# Patient Record
Sex: FEMALE | Race: WHITE | HISPANIC OR LATINO | Employment: UNEMPLOYED | URBAN - METROPOLITAN AREA
[De-identification: names, ages, dates, MRNs, and addresses within clinical notes are randomized per-mention and may not be internally consistent; named-entity substitution may affect disease eponyms.]

---

## 2023-08-10 ENCOUNTER — HOSPITAL ENCOUNTER (EMERGENCY)
Facility: HOSPITAL | Age: 33
Discharge: HOME | End: 2023-08-10
Attending: EMERGENCY MEDICINE

## 2023-08-10 VITALS
OXYGEN SATURATION: 100 % | BODY MASS INDEX: 23.95 KG/M2 | DIASTOLIC BLOOD PRESSURE: 65 MMHG | TEMPERATURE: 99 F | RESPIRATION RATE: 18 BRPM | HEIGHT: 60 IN | WEIGHT: 122 LBS | HEART RATE: 96 BPM | SYSTOLIC BLOOD PRESSURE: 117 MMHG

## 2023-08-10 DIAGNOSIS — R21 RASH: Primary | ICD-10-CM

## 2023-08-10 LAB
B-HCG UR QL: NEGATIVE
CTP QC/QA: YES

## 2023-08-10 PROCEDURE — 99282 EMERGENCY DEPT VISIT SF MDM: CPT

## 2023-08-10 PROCEDURE — 81025 URINE PREGNANCY TEST: CPT | Performed by: PHYSICIAN ASSISTANT

## 2023-08-10 PROCEDURE — 25000003 PHARM REV CODE 250: Performed by: PHYSICIAN ASSISTANT

## 2023-08-10 PROCEDURE — 25000003 PHARM REV CODE 250: Performed by: EMERGENCY MEDICINE

## 2023-08-10 RX ORDER — CETIRIZINE HYDROCHLORIDE 10 MG/1
10 TABLET ORAL
Status: COMPLETED | OUTPATIENT
Start: 2023-08-10 | End: 2023-08-10

## 2023-08-10 RX ORDER — CETIRIZINE HYDROCHLORIDE 10 MG/1
10 TABLET ORAL DAILY
Qty: 20 TABLET | Refills: 0 | Status: SHIPPED | OUTPATIENT
Start: 2023-08-10 | End: 2023-08-30

## 2023-08-10 RX ORDER — ACETAMINOPHEN 325 MG/1
650 TABLET ORAL
Status: COMPLETED | OUTPATIENT
Start: 2023-08-10 | End: 2023-08-10

## 2023-08-10 RX ADMIN — CETIRIZINE HYDROCHLORIDE 10 MG: 10 TABLET, FILM COATED ORAL at 03:08

## 2023-08-10 RX ADMIN — ACETAMINOPHEN 650 MG: 325 TABLET ORAL at 02:08

## 2023-08-10 NOTE — ED PROVIDER NOTES
Encounter Date: 8/10/2023       History     Chief Complaint   Patient presents with    Rash     Started last night and has spread all over the body. Small puss filled bumps. Has been itching and had a fever.     33-year-old healthy female with no known past medical history presents with complaint of rash.  Patient says that last night she had onset of a generalized rash throughout her body including her face, torso, arms and legs.  She denies any recent travel.  Denies fever, shortness of breath, nausea, vomiting, throat swelling, any recent medication use including antibiotics, anyone with similar symptoms.  She says that there is no associated pain, there is occasional itching.  She is never had symptoms like this before.  Denies any recent tick or bug bites.  Denies any peeling of the rash.    The history is provided by the patient. The history is limited by a language barrier. A  was used.     Review of patient's allergies indicates:  No Known Allergies  No past medical history on file.  No past surgical history on file.  No family history on file.     Review of Systems    Physical Exam     Initial Vitals [08/10/23 1418]   BP Pulse Resp Temp SpO2   117/65 96 18 99.1 °F (37.3 °C) 100 %      MAP       --         Physical Exam    Nursing note and vitals reviewed.  Constitutional: She appears well-developed. She is not diaphoretic. No distress.   HENT:   Head: Normocephalic and atraumatic.   Eyes: EOM are normal. Pupils are equal, round, and reactive to light.   Neck:   Normal range of motion.  Cardiovascular:  Normal rate.           Pulmonary/Chest: No respiratory distress.   Abdominal: She exhibits no distension.   Musculoskeletal:         General: Normal range of motion.      Cervical back: Normal range of motion.     Neurological: She is alert and oriented to person, place, and time.   Skin: Skin is warm and dry. Rash noted.   Rash covering face, torso, bilateral upper and lower extremities.   Spares palms and soles.  No mucosal involvement.  Negative Nikolsky sign. See clinical image below.    Psychiatric: She has a normal mood and affect.           ED Course   Procedures  Labs Reviewed   POCT URINE PREGNANCY          Imaging Results    None          Medications   acetaminophen tablet 650 mg (650 mg Oral Given 8/10/23 1430)   cetirizine tablet 10 mg (10 mg Oral Given 8/10/23 1530)     Medical Decision Making:   Initial Assessment:   No prior records available  Differential Diagnosis:   Allergic reaction, SJS, TENS, erythema multiforme, erythema nodosum, meningitis, urticaria, scabies  Clinical Tests:   Lab Tests: Ordered and Reviewed  ED Management:  33-year-old female presents with 2 days painless diffuse rash, mild associated itching.  Afebrile, stable vital signs.  Low suspicion for necrotizing infection (including SJS/TEN) given no necrosis, bullae, or crepitance, negative nikolsky, no mucosal involvement. The patient is overall well appearing, non toxic, vs wnl, afebrile. No new medications, severe pain, or immunosuppression. They were given strict return precautions including to return for mucosal involvement of the rash, fever>100.4, severe pain or any other concerns. Given zyrtec for symptomatic therapy. Patient instructed to follow up outpatient for ongoing care. Referrals placed for primary care and dermatology.     No acute emergent medical condition has been identified. The patient appears to be low risk for an emergent medical condition is appropriate for discharge with outpatient f/u as detailed in discharge instructions for reevaluation and possible continued outpatient workup and management. I have discussed the workup with the patient, who has verbalized understanding of the plan and need for outpatient follow-up.  This evaluation does not preclude the development of an emergent condition so in addition, I have advised the patient that they can return to the ED at any time with worsening  or change of their symptoms, or with any other medical complaint.                             Clinical Impression:   Final diagnoses:  [R21] Rash (Primary)        ED Disposition Condition    Discharge Stable          ED Prescriptions       Medication Sig Dispense Start Date End Date Auth. Provider    cetirizine (ZYRTEC) 10 MG tablet Take 1 tablet (10 mg total) by mouth once daily. You can increase to twice daily as needed for itching. for 20 days 20 tablet 8/10/2023 8/30/2023 Camila Kim MD          Follow-up Information       Follow up With Specialties Details Why Contact Info Additional Information    [facility] Emergency Dept Emergency Medicine  As needed, If symptoms worsen [address] [id number] [phone]     [facility] Family Medicine Family Medicine Schedule an appointment as soon as possible for a visit in 2 days  [address], [address]  [facility] [id number] [phone] Please park in [address] or D and use [address] entrance. Take Medical Office Bldg. elevators.    [facility] DERMATOLOGY Dermatology Schedule an appointment as soon as possible for a visit in 2 days  [address] [address]  [phone]              Camila Kim MD  08/10/23 9425

## 2023-08-10 NOTE — Clinical Note
"Elvia ROOT" Vidal Nassar was seen and treated in our emergency department on 8/10/2023.  She may return to work on 08/14/2023.       If you have any questions or concerns, please don't hesitate to call.      Michael Joyce LPN    "

## 2023-08-10 NOTE — DISCHARGE INSTRUCTIONS

## 2024-11-19 ENCOUNTER — OFFICE VISIT (OUTPATIENT)
Dept: OBSTETRICS AND GYNECOLOGY | Facility: CLINIC | Age: 34
End: 2024-11-19
Payer: MEDICAID

## 2024-11-19 VITALS
HEIGHT: 60 IN | WEIGHT: 138.25 LBS | SYSTOLIC BLOOD PRESSURE: 110 MMHG | BODY MASS INDEX: 27.14 KG/M2 | DIASTOLIC BLOOD PRESSURE: 64 MMHG

## 2024-11-19 DIAGNOSIS — N93.9 ABNORMAL UTERINE BLEEDING (AUB): Primary | ICD-10-CM

## 2024-11-19 LAB
B-HCG UR QL: POSITIVE
CTP QC/QA: YES

## 2024-11-19 PROCEDURE — 99203 OFFICE O/P NEW LOW 30 MIN: CPT | Mod: S$PBB,,, | Performed by: STUDENT IN AN ORGANIZED HEALTH CARE EDUCATION/TRAINING PROGRAM

## 2024-11-19 PROCEDURE — 81025 URINE PREGNANCY TEST: CPT | Mod: PBBFAC | Performed by: STUDENT IN AN ORGANIZED HEALTH CARE EDUCATION/TRAINING PROGRAM

## 2024-11-19 PROCEDURE — 99999 PR PBB SHADOW E&M-EST. PATIENT-LVL III: CPT | Mod: PBBFAC,,, | Performed by: STUDENT IN AN ORGANIZED HEALTH CARE EDUCATION/TRAINING PROGRAM

## 2024-11-19 PROCEDURE — 99999PBSHW POCT URINE PREGNANCY: Mod: PBBFAC,,,

## 2024-11-19 PROCEDURE — 99213 OFFICE O/P EST LOW 20 MIN: CPT | Mod: PBBFAC | Performed by: STUDENT IN AN ORGANIZED HEALTH CARE EDUCATION/TRAINING PROGRAM

## 2024-11-19 RX ORDER — ACETAMINOPHEN 500 MG
500 TABLET ORAL EVERY 6 HOURS PRN
Qty: 30 TABLET | Refills: 3 | Status: SHIPPED | OUTPATIENT
Start: 2024-11-19

## 2024-11-19 RX ORDER — B-COMPLEX WITH VITAMIN C
1 TABLET ORAL DAILY
Qty: 90 TABLET | Refills: 2 | Status: SHIPPED | OUTPATIENT
Start: 2024-11-19

## 2024-11-19 NOTE — PROGRESS NOTES
Subjective     Patient ID: Elvia Nassar is a 34 y.o. female.    Chief Complaint:  Gynecologic Exam    History of Present Illness  35 yo  at 4w4d presents for pregnancy confirmation    Took 6 UPT at home, all positive. UPT in office positive today  This is a desired pregnancy, last one was 17 years ago in [facility], was a  section. She says because she was 17 they measured her pelvis and told her vaginal delivery would not be successful so she had a primary C section  Pt reports she is Rh negative, says she was told she will need rhogam in her second pregnancy (possibly child same blood type). Denies bleeding this pregnancy     Not currently insured and will be applying for medicaid after this visit  Lives in [place], drove 30 minutes to get here today bc of no appointments at [place] location      [employer] [id number]    GYN & OB History  Patient's last menstrual period was 10/18/2024.   Date of Last Pap: No result found    OB History    Para Term  AB Living   2             SAB IAB Ectopic Multiple Live Births                  # Outcome Date GA Lbr Keshav/2nd Weight Sex Type Anes PTL Lv   2 Current            1  2007     CS-Unspec         Birth Comments: Pelvimetry performed in [facility], was told she cannot labor       Review of Systems  Review of Systems   All other systems reviewed and are negative.         Objective   Physical Exam:   Constitutional: She is oriented to person, place, and time. She appears well-developed and well-nourished.    HENT:   Head: Normocephalic and atraumatic.    Eyes: Conjunctivae and EOM are normal.      Pulmonary/Chest: Effort normal.                  Musculoskeletal: Normal range of motion.       Neurological: She is alert and oriented to person, place, and time.    Skin: Skin is warm and dry.    Psychiatric: She has a normal mood and affect.         Recent Labs   Lab Result Units 24  1137   POC Preg Test, Ur  Positive*           Assessment and Plan     1. Abnormal uterine bleeding (AUB)         Plan:  1. Abnormal uterine bleeding (AUB) (Primary)  - discussed recommended blood work, pt will go to hospital to apply for Medicaid and then can get labs drawn  - Will try to arrange follow up at [facility] due to closer proximity to her house  - Recommended tylenol for abdominal pain  - Instructed her to proceed to hospital if she has any vaginal bleeding due to reported Rh negative blood type  - US ordered     - POCT Urine Pregnancy  - US OB/GYN Procedure ([facility]) - Extended List; Future    Follow up in 4 weeks for IOB      Los Lambert III, MD  [facility] - OB GYN   [address]  [place] [address] [phone]

## 2024-11-19 NOTE — PATIENT INSTRUCTIONS
Please proceed to [facility] medicaid office to apply for coverage now that you are pregnant

## 2024-11-20 ENCOUNTER — TELEPHONE (OUTPATIENT)
Dept: OBSTETRICS AND GYNECOLOGY | Facility: CLINIC | Age: 34
End: 2024-11-20
Payer: MEDICAID

## 2024-11-20 NOTE — TELEPHONE ENCOUNTER
Received mess from Jose that pt would like to transfer to [facility]. Placed call to pt with assist of Handy Mckeon #[phone]. Explained process of OB navigator appt. Scheduled OB navigator vv at convenient time for pt. Lmp 10/18/24. Questions answered. Verbalized understanding.

## 2024-11-20 NOTE — TELEPHONE ENCOUNTER
Called with  [phone]     Got pt scheduled for initial OB appt with . Pt asked where her prenatals from  are. Gave her location of [facility]. Pt VU. Pt also has ob juliane appt on 11/25.

## 2024-11-20 NOTE — TELEPHONE ENCOUNTER
----- Message from Los Lambert MD sent at 11/19/2024 11:53 AM CST -----  Regarding: OB at [facility], this patient is establishing care for pregnancy at [facility] but she lives in [place].     Could you give her a call if there are any slots for initial OB with any providers at [place]? Phone # is correct.    Thank you  Dr. Lambert

## 2024-11-29 ENCOUNTER — HOSPITAL ENCOUNTER (EMERGENCY)
Facility: HOSPITAL | Age: 34
Discharge: HOME | End: 2024-11-29
Attending: EMERGENCY MEDICINE
Payer: MEDICAID

## 2024-11-29 VITALS
OXYGEN SATURATION: 98 % | HEIGHT: 60 IN | HEART RATE: 77 BPM | WEIGHT: 138 LBS | TEMPERATURE: 98 F | BODY MASS INDEX: 27.09 KG/M2 | SYSTOLIC BLOOD PRESSURE: 127 MMHG | RESPIRATION RATE: 17 BRPM | DIASTOLIC BLOOD PRESSURE: 79 MMHG

## 2024-11-29 DIAGNOSIS — O20.9 VAGINAL BLEEDING AFFECTING EARLY PREGNANCY: Primary | ICD-10-CM

## 2024-11-29 LAB
ABO GROUP BLD: NORMAL
B-HCG UR QL: POSITIVE
BACTERIA #/AREA URNS HPF: ABNORMAL /HPF
BASOPHILS # BLD AUTO: 0.02 K/UL (ref 0–0.2)
BASOPHILS NFR BLD: 0.4 % (ref 0–1.9)
BILIRUB UR QL STRIP: NEGATIVE
BLD GP AB SCN CELLS X3 SERPL QL: NORMAL
CLARITY UR: CLEAR
COLOR UR: COLORLESS
CTP QC/QA: YES
DIFFERENTIAL METHOD BLD: ABNORMAL
EOSINOPHIL # BLD AUTO: 0.1 K/UL (ref 0–0.5)
EOSINOPHIL NFR BLD: 1.1 % (ref 0–8)
ERYTHROCYTE [DISTWIDTH] IN BLOOD BY AUTOMATED COUNT: 12.6 % (ref 11.5–14.5)
GLUCOSE UR QL STRIP: NEGATIVE
HCG INTACT+B SERPL-ACNC: NORMAL MIU/ML
HCT VFR BLD AUTO: 35.1 % (ref 37–48.5)
HGB BLD-MCNC: 11.9 G/DL (ref 12–16)
HGB UR QL STRIP: ABNORMAL
IMM GRANULOCYTES # BLD AUTO: 0.02 K/UL (ref 0–0.04)
IMM GRANULOCYTES NFR BLD AUTO: 0.4 % (ref 0–0.5)
INJECT RH IG VOL PATIENT: NORMAL ML
KETONES UR QL STRIP: NEGATIVE
LEUKOCYTE ESTERASE UR QL STRIP: NEGATIVE
LYMPHOCYTES # BLD AUTO: 1.8 K/UL (ref 1–4.8)
LYMPHOCYTES NFR BLD: 38.1 % (ref 18–48)
MCH RBC QN AUTO: 32.2 PG (ref 27–31)
MCHC RBC AUTO-ENTMCNC: 33.9 G/DL (ref 32–36)
MCV RBC AUTO: 95 FL (ref 82–98)
MICROSCOPIC COMMENT: ABNORMAL
MONOCYTES # BLD AUTO: 0.6 K/UL (ref 0.3–1)
MONOCYTES NFR BLD: 12 % (ref 4–15)
NEUTROPHILS # BLD AUTO: 2.3 K/UL (ref 1.8–7.7)
NEUTROPHILS NFR BLD: 48 % (ref 38–73)
NITRITE UR QL STRIP: NEGATIVE
NRBC BLD-RTO: 0 /100 WBC
PH UR STRIP: 6 [PH] (ref 5–8)
PLATELET # BLD AUTO: 236 K/UL (ref 150–450)
PMV BLD AUTO: 10 FL (ref 9.2–12.9)
PROT UR QL STRIP: NEGATIVE
RBC # BLD AUTO: 3.69 M/UL (ref 4–5.4)
RBC #/AREA URNS HPF: 7 /HPF (ref 0–4)
RH BLD: NORMAL
SP GR UR STRIP: 1.01 (ref 1–1.03)
SQUAMOUS #/AREA URNS HPF: 1 /HPF
URN SPEC COLLECT METH UR: ABNORMAL
UROBILINOGEN UR STRIP-ACNC: NEGATIVE EU/DL
WBC # BLD AUTO: 4.75 K/UL (ref 3.9–12.7)
WBC #/AREA URNS HPF: 1 /HPF (ref 0–5)

## 2024-11-29 PROCEDURE — 85025 COMPLETE CBC W/AUTO DIFF WBC: CPT | Performed by: EMERGENCY MEDICINE

## 2024-11-29 PROCEDURE — 81025 URINE PREGNANCY TEST: CPT | Performed by: EMERGENCY MEDICINE

## 2024-11-29 PROCEDURE — 99284 EMERGENCY DEPT VISIT MOD MDM: CPT | Mod: 25

## 2024-11-29 PROCEDURE — 86850 RBC ANTIBODY SCREEN: CPT | Performed by: EMERGENCY MEDICINE

## 2024-11-29 PROCEDURE — 63600175 PHARM REV CODE 636 W HCPCS: Mod: JZ,JG | Performed by: EMERGENCY MEDICINE

## 2024-11-29 PROCEDURE — 84702 CHORIONIC GONADOTROPIN TEST: CPT | Performed by: EMERGENCY MEDICINE

## 2024-11-29 PROCEDURE — 86901 BLOOD TYPING SEROLOGIC RH(D): CPT | Performed by: EMERGENCY MEDICINE

## 2024-11-29 PROCEDURE — 96372 THER/PROPH/DIAG INJ SC/IM: CPT | Performed by: EMERGENCY MEDICINE

## 2024-11-29 PROCEDURE — 81000 URINALYSIS NONAUTO W/SCOPE: CPT | Performed by: EMERGENCY MEDICINE

## 2024-11-29 RX ADMIN — HUMAN RHO(D) IMMUNE GLOBULIN 1500 UNITS: 1500 INJECTION, SOLUTION INTRAMUSCULAR; INTRAVENOUS at 06:11

## 2024-11-29 NOTE — ED NOTES
This RN contacted lab in regards to Rhogam. Endorses difficulty seeing order. Order released. Awaiting medication.

## 2024-11-29 NOTE — ED PROVIDER NOTES
Encounter Date: 2024       History     Chief Complaint   Patient presents with    Vaginal Bleeding     Patient reports to the ED complaining of lower abdominal pain and spotting that started ~40 minutes ago. Patient is 6 weeks pregnant. Ambulatory to triage, NAD noted.     Patient is a 34-year-old female who says she is 6 weeks pregnant and began with pelvic pain and vaginal bleeding today.  No urinary complaints.  No vomiting.  She is currently receiving prenatal care.      Review of patient's allergies indicates:  No Known Allergies  History reviewed. No pertinent past medical history.  Past Surgical History:   Procedure Laterality Date     SECTION      Reports she could not labor dur to pelvimetry in [facility]     Family History   Problem Relation Name Age of Onset    Ovarian cancer Paternal Grandmother      Genetic Disorder Neg Hx      Breast cancer Neg Hx      Colon cancer Neg Hx       Social History     Tobacco Use    Smoking status: Never    Smokeless tobacco: Never   Substance Use Topics    Alcohol use: Not Currently    Drug use: Never     Review of Systems   Genitourinary:  Positive for pelvic pain and vaginal bleeding.   All other systems reviewed and are negative.      Physical Exam     Initial Vitals [24 1245]   BP Pulse Resp Temp SpO2   116/70 90 16 98.3 °F (36.8 °C) 100 %      MAP       --         Physical Exam    Nursing note and vitals reviewed.  Constitutional: No distress.   Eyes: Conjunctivae are normal.   Cardiovascular:  Normal rate, regular rhythm and normal heart sounds.           Pulmonary/Chest: No respiratory distress.   Abdominal: Abdomen is soft. Bowel sounds are normal.   There is mild suprapubic tenderness without guarding or rebound.   Musculoskeletal:         General: No edema.     Neurological: She is alert and oriented to person, place, and time.   Skin: Skin is dry.         ED Course   Procedures  Labs Reviewed   URINALYSIS - Abnormal       Result Value     Specimen UA Urine, Clean Catch      Color, UA Colorless (*)     Appearance, UA Clear      pH, UA 6.0      Specific Gravity, UA 1.015      Protein, UA Negative      Glucose, UA Negative      Ketones, UA Negative      Bilirubin (UA) Negative      Occult Blood UA 2+ (*)     Nitrite, UA Negative      Urobilinogen, UA Negative      Leukocytes, UA Negative     CBC W/ AUTO DIFFERENTIAL - Abnormal    WBC 4.75      RBC 3.69 (*)     Hemoglobin 11.9 (*)     Hematocrit 35.1 (*)     MCV 95      MCH 32.2 (*)     MCHC 33.9      RDW 12.6      Platelets 236      MPV 10.0      Immature Granulocytes 0.4      Gran # (ANC) 2.3      Immature Grans (Abs) 0.02      Lymph # 1.8      Mono # 0.6      Eos # 0.1      Baso # 0.02      nRBC 0      Gran % 48.0      Lymph % 38.1      Mono % 12.0      Eosinophil % 1.1      Basophil % 0.4      Differential Method Automated      Narrative:     Release to patient->Immediate   URINALYSIS MICROSCOPIC - Abnormal    RBC, UA 7 (*)     WBC, UA 1      Bacteria Rare      Squam Epithel, UA 1      Microscopic Comment SEE COMMENT     POCT URINE PREGNANCY - Abnormal    POC Preg Test, Ur Positive (*)      Acceptable Yes     HCG, QUANTITATIVE    HCG Quant [id number]      Narrative:     Release to patient->Immediate   GROUP & RH    ABO O      Rh Type NEG     INDIRECT ANTIGLOBULIN TEST    Indirect Dean NEG     PREPARE RH IMMUNE GLOBULIN    Rh Immune Glogulin RHG [id number] Issued            Imaging Results              US OB <14 Wks TransAbd & TransVag, Single Gestation (XPD) (Final result)  Result time 11/29/24 16:11:21   Procedure changed from US OB Limited 1 Or More Gestations     Final result by Michael Nava MD (11/29/24 16:11:21)                   Impression:      Single live intrauterine pregnancy with estimated gestational age 5 weeks 6 days. and an MARQUIS of 07/26/2025.    Small subchorionic hemorrhage.    Left ovarian 3.3 cm simple cyst, within normal limits for physiologic size range.  In the  absence of any current left-sided pelvic pain, no follow-up necessary.      Electronically signed by: Michael Nava MD  Date:    11/29/2024  Time:    16:11               Narrative:    EXAMINATION:  US OB <14 WEEKS, TRANSABDOM & TRANSVAG, SINGLE GESTATION (XPD)    CLINICAL HISTORY:  Vaginal bleeding in pregnancy;    TECHNIQUE:  Transabdominal sonography of the pelvis was performed, followed by transvaginal sonography to better evaluate the uterus and ovaries.    COMPARISON:  None.    FINDINGS:  Uterus: 9 x 5 x 4.8 cm.  No discrete fibroid.    Intrauterine gestation(s): Single    Mean gestational sac diameter: 0.9 cm    Yolk sac: Present    Crown-rump length (CRL): 0.2 cm    Cardiac activity: 120 bpm    Subchorionic hemorrhage: 1 x 0.9 x 0.3 cm    Right ovary: 2.3 x 2.6 x 2 cm with intact arterial and venous flow    Left ovary: 4 x 3.5 x 4.2 cm with intact arterial and venous flow containing a 3.3 x 3.2 x 3.2 cm well-circumscribed nonvascular anechoic mass suggestive of a simple cyst.    Miscellaneous: No Free Fluid.                                       Medications   rho(d) immune globulin 1,500 unit (300 mcg)/1.3 mL injection 1,500 Units (1,500 Units Intramuscular Given 11/29/24 1804)     Medical Decision Making  Emergent evaluation of a 34-year-old female who is 6 weeks pregnant and began with vaginal bleeding and pelvic pain today.  Patient is Rh negative and a RhoGAM shot has been ordered.  Ultrasound results are pending at the time of shift change and further care of the patient will be turned over to Dr. Samuels pending results and final disposition.    Amount and/or Complexity of Data Reviewed  Labs: ordered.     Details: CBC with a hemoglobin of 11.9 and hematocrit of 35.1.  Urinalysis with 2+ occult blood, no signs of infection.  Beta hCG is 11,409.  Radiology: ordered.    Risk  Prescription drug management.                                      Clinical Impression:  Final diagnoses:  [O20.9] Vaginal  bleeding affecting early pregnancy (Primary)          ED Disposition Condition    Discharge Stable          ED Prescriptions    None       Follow-up Information       Follow up With Specialties Details Why Contact Info    Kelli Timmons MD Obstetrics and Gynecology Schedule an appointment as soon as possible for a visit   [address] [place] [address]  [phone]      [facility] - Emergency Dept Emergency Medicine  As needed, If symptoms worsen [address] [place] [id number] [phone]             Shashank Mayfield MD  11/30/24 0618

## 2024-11-29 NOTE — ED NOTES
Assessment completed with family as  per pt request. 35 yo female patient presents to the ED with c/o vaginal bleeding while approx 6 weeks pregnant. Patient explains around 1100 this morning she went to the bathroom and noted moderate amount of blood with urination. States she just went to the bathroom in ED and noted scant amount of blood while wiping. Patient denies filling pads or blood noted at any other point. Patient endorses mild abd cramping that has since began. Patient 2nd pregnancy, 1st was 17 years ago. Pt denies prenatal visit thus far. Updated on care plan. Bloodwork and urine sent to lab. Pt denies needs. Nadn. Care ongoing.

## 2024-11-29 NOTE — ED NOTES
Patient updated on plan of care with use of family as . Patient aware of upcoming medication and awaiting u/s reading. Denies questions. Care ongoing.

## 2024-11-29 NOTE — PROVIDER PROGRESS NOTES - EMERGENCY DEPT.
Encounter Date: 11/29/2024    ED Physician Progress Notes              Patient signed out pending ultrasound result.    TVUS Impression:     Single live intrauterine pregnancy with estimated gestational age 5 weeks 6 days. and an MARQUIS of 07/26/2025.     Small subchorionic hemorrhage.     Left ovarian 3.3 cm simple cyst, within normal limits for physiologic size range.  In the absence of any current left-sided pelvic pain, no follow-up necessary.    Patient says her pain is mild and well-controlled, informed about incidental finding of left ovarian cyst.  Says that she already has follow up scheduled with OB, Dr. Timmons next month.  Said that she is already taking prenatal vitamins.  Counseled about strict return precautions and given outpatient follow up.    No acute emergent medical condition has been identified. The patient appears to be low risk for an emergent medical condition is appropriate for discharge with outpatient f/u as detailed in discharge instructions for reevaluation and possible continued outpatient workup and management. I have discussed the workup with the patient, who has verbalized understanding of the plan and need for outpatient follow-up.  This evaluation does not preclude the development of an emergent condition so in addition, I have advised the patient that they can return to the ED at any time with worsening or change of their symptoms, or with any other medical complaint.

## 2024-11-29 NOTE — DISCHARGE INSTRUCTIONS

## 2024-12-02 ENCOUNTER — TELEPHONE (OUTPATIENT)
Dept: OBSTETRICS AND GYNECOLOGY | Facility: CLINIC | Age: 34
End: 2024-12-02
Payer: MEDICAID

## 2024-12-02 NOTE — TELEPHONE ENCOUNTER
Called patient and informed her that we did not have anything sooner and she stated she would keep her appointment.       ----- Message from Tata sent at 2024  1:22 PM CST -----  [phone]  Type:  Sooner Appointment Request    Patient is requesting a sooner appointment.  Patient declined first available appointment listed as well as another facility and provider .  Patient will not accept being placed on the waitlist and is requesting a message be sent to doctor.    Name of Caller:  self     When is the first available appointment?     Symptoms:  initial OB, asked if she can have a sooner appt due to going out of town .     Would the patient rather a call back or a response via [facility]?  Call back     Best Call Back Number:  [phone]

## 2024-12-04 ENCOUNTER — CLINICAL SUPPORT (OUTPATIENT)
Dept: OBSTETRICS AND GYNECOLOGY | Facility: CLINIC | Age: 34
End: 2024-12-04
Payer: MEDICAID

## 2024-12-04 ENCOUNTER — PATIENT MESSAGE (OUTPATIENT)
Dept: OBSTETRICS AND GYNECOLOGY | Facility: CLINIC | Age: 34
End: 2024-12-04

## 2024-12-04 DIAGNOSIS — N91.2 AMENORRHEA: Primary | ICD-10-CM

## 2024-12-04 NOTE — PROGRESS NOTES
Spoke with patient for a total of 60 minutes during phone call due to pt unable to connect to virtual visit. Pt driving but parked for phone call visit. [employer] interp assisted with call [place] #[phone].  Updated chart to reflect up to date patient demographics.  Allergies, medications, pharmacy, medical/family history and OB history updated.  Patient was guided through expectations of care during pregnancy.  First routine OB appt scheduled. Initial OB appt previously scheduled. Had u/s in ED-did not reschedule.   Education provided & questions answered. Encouraged to send message or call office with any questions/concerns. Verbalized understanding.     Discussed with pt:    Lmp 10/18; MARQUIS 7/26/25 per u/s report   taking PNV   denies n/v  denies cramping/spotting  C/o headaches  Precautions discussed  Referred to [email or web address] for Preg A to Z guide & class schedule   Discussed benefits of breastfeeding - plans to breastfeed   Discussed need for pediatrician  Had ED visit on 11/29-u/s done ~5w6d with + cardiac activity & small subchorionic hemorrhage per report

## 2024-12-19 ENCOUNTER — ROUTINE PRENATAL (OUTPATIENT)
Dept: OBSTETRICS AND GYNECOLOGY | Facility: CLINIC | Age: 34
End: 2024-12-19
Payer: MEDICAID

## 2024-12-19 VITALS
BODY MASS INDEX: 27.71 KG/M2 | WEIGHT: 141.88 LBS | SYSTOLIC BLOOD PRESSURE: 105 MMHG | DIASTOLIC BLOOD PRESSURE: 71 MMHG

## 2024-12-19 DIAGNOSIS — Z13.79 GENETIC SCREENING: ICD-10-CM

## 2024-12-19 DIAGNOSIS — Z12.4 PAP SMEAR FOR CERVICAL CANCER SCREENING: ICD-10-CM

## 2024-12-19 DIAGNOSIS — Z36.0 ENCOUNTER FOR ANTENATAL SCREENING FOR CHROMOSOMAL ANOMALIES: ICD-10-CM

## 2024-12-19 DIAGNOSIS — Z36.89 ENCOUNTER FOR FETAL ANATOMIC SURVEY: ICD-10-CM

## 2024-12-19 DIAGNOSIS — Z11.3 SCREENING EXAMINATION FOR STD (SEXUALLY TRANSMITTED DISEASE): ICD-10-CM

## 2024-12-19 DIAGNOSIS — Z3A.08 8 WEEKS GESTATION OF PREGNANCY: Primary | ICD-10-CM

## 2024-12-19 DIAGNOSIS — Z72.89 OTHER PROBLEMS RELATED TO LIFESTYLE: ICD-10-CM

## 2024-12-19 LAB
BILIRUB SERPL-MCNC: NEGATIVE MG/DL
BLOOD URINE, POC: NORMAL
CLARITY, POC UA: NORMAL
COLOR, POC UA: NORMAL
GLUCOSE UR QL STRIP: NEGATIVE
KETONES UR QL STRIP: NEGATIVE
LEUKOCYTE ESTERASE URINE, POC: NEGATIVE
NITRITE, POC UA: NEGATIVE
PH, POC UA: 7
PROTEIN, POC: NEGATIVE
SPECIFIC GRAVITY, POC UA: 1.01
UROBILINOGEN, POC UA: 0.2

## 2024-12-19 PROCEDURE — 99212 OFFICE O/P EST SF 10 MIN: CPT | Mod: PBBFAC,TH,PO | Performed by: OBSTETRICS & GYNECOLOGY

## 2024-12-19 PROCEDURE — 0352U VAGINOSIS SCREEN BY DNA PROBE: CPT | Performed by: OBSTETRICS & GYNECOLOGY

## 2024-12-19 PROCEDURE — 87491 CHLMYD TRACH DNA AMP PROBE: CPT | Performed by: OBSTETRICS & GYNECOLOGY

## 2024-12-19 PROCEDURE — 99999 PR PBB SHADOW E&M-EST. PATIENT-LVL II: CPT | Mod: PBBFAC,,, | Performed by: OBSTETRICS & GYNECOLOGY

## 2024-12-19 PROCEDURE — 81002 URINALYSIS NONAUTO W/O SCOPE: CPT | Mod: PBBFAC,PO | Performed by: OBSTETRICS & GYNECOLOGY

## 2024-12-19 PROCEDURE — 99999PBSHW POCT URINE DIPSTICK WITHOUT MICROSCOPE: Mod: PBBFAC,,,

## 2024-12-19 RX ORDER — PRENATAL WITH FERROUS FUM AND FOLIC ACID 3080; 920; 120; 400; 22; 1.84; 3; 20; 10; 1; 12; 200; 27; 25; 2 [IU]/1; [IU]/1; MG/1; [IU]/1; MG/1; MG/1; MG/1; MG/1; MG/1; MG/1; UG/1; MG/1; MG/1; MG/1; MG/1
1 TABLET ORAL DAILY
Qty: 90 TABLET | Refills: 3 | Status: SHIPPED | OUTPATIENT
Start: 2024-12-19 | End: 2025-12-19

## 2024-12-19 NOTE — PROGRESS NOTES
Doing well, has seen spotting but not for 2 weeks.     FHT normal   Beside US normal appearing    OB: h/o  at 17     Pelvic: normal appearing cervix, no bleeding, closed     @ 8 5/7 wga   IOB labs ordered   Discussed MT21, desires, ordered  Anatomy US ordered   History of , desires repeat   Rh negative, got rhogam in hospital   Unsure partners blood type but ordered for him to do     Face to Face time with patient: 30 minutes of total time spent on the encounter, which includes face to face time and non-face to face time preparing to see the patient (eg, review of tests), Obtaining and/or reviewing separately obtained history, Documenting clinical information in the electronic or other health record, Independently interpreting results (not separately reported) and communicating results to the patient/family/caregiver, or Care coordination (not separately reported).

## 2024-12-20 ENCOUNTER — TELEPHONE (OUTPATIENT)
Dept: OBSTETRICS AND GYNECOLOGY | Facility: CLINIC | Age: 34
End: 2024-12-20
Payer: MEDICAID

## 2024-12-20 NOTE — TELEPHONE ENCOUNTER
Called patient and informed her that I will schedule her future OB appointment. Patient stated any day mornings was fine. She also wants to know the gender of the baby in the portal and also during her appointment.

## 2024-12-24 ENCOUNTER — TELEPHONE (OUTPATIENT)
Dept: OBSTETRICS AND GYNECOLOGY | Facility: CLINIC | Age: 34
End: 2024-12-24
Payer: MEDICAID

## 2024-12-24 RX ORDER — TERCONAZOLE 4 MG/G
1 CREAM VAGINAL NIGHTLY
Qty: 45 G | Refills: 1 | Status: SHIPPED | OUTPATIENT
Start: 2024-12-24 | End: 2024-12-31

## 2024-12-24 NOTE — TELEPHONE ENCOUNTER
----- Message from Kelli Timmons MD sent at 12/24/2024  6:36 AM CST -----  Pls let her know she tested positive for yeast. This could have been contributing tot he vaginal spotting. I sent her in vaginal cream she uses nightly for a week.

## 2024-12-31 ENCOUNTER — PATIENT MESSAGE (OUTPATIENT)
Dept: OBSTETRICS AND GYNECOLOGY | Facility: CLINIC | Age: 34
End: 2024-12-31
Payer: MEDICAID

## 2025-01-07 ENCOUNTER — LAB VISIT (OUTPATIENT)
Dept: LAB | Facility: HOSPITAL | Age: 35
End: 2025-01-07
Attending: OBSTETRICS & GYNECOLOGY
Payer: MEDICAID

## 2025-01-07 DIAGNOSIS — Z3A.08 8 WEEKS GESTATION OF PREGNANCY: ICD-10-CM

## 2025-01-07 DIAGNOSIS — Z13.79 GENETIC SCREENING: ICD-10-CM

## 2025-01-07 DIAGNOSIS — Z72.89 OTHER PROBLEMS RELATED TO LIFESTYLE: ICD-10-CM

## 2025-01-07 DIAGNOSIS — Z11.3 SCREENING EXAMINATION FOR STD (SEXUALLY TRANSMITTED DISEASE): ICD-10-CM

## 2025-01-07 DIAGNOSIS — Z36.0 ENCOUNTER FOR ANTENATAL SCREENING FOR CHROMOSOMAL ANOMALIES: ICD-10-CM

## 2025-01-07 LAB
ABO + RH BLD: ABNORMAL
BLD GP AB SCN CELLS X3 SERPL QL: ABNORMAL
BLOOD GROUP ANTIBODIES SERPL: NORMAL
DAT IGG-SP REAG RBC-IMP: NORMAL
HBV SURFACE AG SERPL QL IA: NORMAL
HCV AB SERPL QL IA: NORMAL
HIV 1+2 AB+HIV1 P24 AG SERPL QL IA: NORMAL
IRON SERPL-MCNC: 76 UG/DL (ref 30–160)
SATURATED IRON: 21 % (ref 20–50)
TOTAL IRON BINDING CAPACITY: 360 UG/DL (ref 250–450)
TRANSFERRIN SERPL-MCNC: 243 MG/DL (ref 200–375)
TREPONEMA PALLIDUM IGG+IGM AB [PRESENCE] IN SERUM OR PLASMA BY IMMUNOASSAY: NONREACTIVE

## 2025-01-07 PROCEDURE — 81220 CFTR GENE COM VARIANTS: CPT | Performed by: OBSTETRICS & GYNECOLOGY

## 2025-01-07 PROCEDURE — 86900 BLOOD TYPING SEROLOGIC ABO: CPT | Performed by: OBSTETRICS & GYNECOLOGY

## 2025-01-07 PROCEDURE — 86880 COOMBS TEST DIRECT: CPT | Performed by: OBSTETRICS & GYNECOLOGY

## 2025-01-07 PROCEDURE — 87389 HIV-1 AG W/HIV-1&-2 AB AG IA: CPT | Performed by: OBSTETRICS & GYNECOLOGY

## 2025-01-07 PROCEDURE — 86762 RUBELLA ANTIBODY: CPT | Performed by: OBSTETRICS & GYNECOLOGY

## 2025-01-07 PROCEDURE — 83540 ASSAY OF IRON: CPT | Performed by: OBSTETRICS & GYNECOLOGY

## 2025-01-07 PROCEDURE — 87340 HEPATITIS B SURFACE AG IA: CPT | Performed by: OBSTETRICS & GYNECOLOGY

## 2025-01-07 PROCEDURE — 83021 HEMOGLOBIN CHROMOTOGRAPHY: CPT | Performed by: OBSTETRICS & GYNECOLOGY

## 2025-01-07 PROCEDURE — 86803 HEPATITIS C AB TEST: CPT | Performed by: OBSTETRICS & GYNECOLOGY

## 2025-01-07 PROCEDURE — 36415 COLL VENOUS BLD VENIPUNCTURE: CPT | Performed by: OBSTETRICS & GYNECOLOGY

## 2025-01-07 PROCEDURE — 86870 RBC ANTIBODY IDENTIFICATION: CPT | Performed by: OBSTETRICS & GYNECOLOGY

## 2025-01-07 PROCEDURE — 86593 SYPHILIS TEST NON-TREP QUANT: CPT | Performed by: OBSTETRICS & GYNECOLOGY

## 2025-01-08 LAB
HGB A2 MFR BLD HPLC: 3 % (ref 2.2–3.2)
HGB FRACT BLD ELPH-IMP: NORMAL
HGB FRACT BLD ELPH-IMP: NORMAL
RUBV IGG SER-ACNC: 102 IU/ML
RUBV IGG SER-IMP: REACTIVE
WEAK D AG RBC QL: NORMAL

## 2025-01-10 LAB — CFTR MUT ANL BLD/T: NORMAL

## 2025-01-12 LAB
ABOUT THE TEST: NORMAL
APPROVED BY: NORMAL
FETAL FRACTION: NORMAL
FETAL SEX RESULT: NORMAL
GESTATIONAL AGE > 9: YES
GESTATIONAL AGE: NORMAL
LAB DIRECTOR COMMENTS: NORMAL
LIMITATIONS:: NORMAL
MONOSOMY X RESULT: NOT DETECTED
NEGATIVE PREDICTIVE VALUE: NORMAL
NOTE: NORMAL
PERFORMANCE CHARACTERISTICS: NORMAL
PERFORMANCE: NORMAL
POSITIVE PREDICTIVE VALUE: NORMAL
RESULT: NEGATIVE
SERVICE CMNT 04-IMP: NORMAL
TEST METHODOLOGY:: NORMAL
TRISOMY 13 (T13): NEGATIVE
TRISOMY 18: NEGATIVE
TRISOMY 21 (T21): NEGATIVE
XXX (TRIPLE X SYNDROME): NOT DETECTED
XXY (KLINEFELTER SYNDROME): NOT DETECTED
XYY (JACOBS SYNDROME): NOT DETECTED

## 2025-01-13 ENCOUNTER — TELEPHONE (OUTPATIENT)
Dept: OBSTETRICS AND GYNECOLOGY | Facility: CLINIC | Age: 35
End: 2025-01-13
Payer: MEDICAID

## 2025-01-13 NOTE — TELEPHONE ENCOUNTER
Pls let her know:     The maternity 21 was negative for down syndrome, trisomy 13 and 18.     If she wants to  know the baby's gender? If not don't open the itravelhart results for the [id number] because it will say it on the results.     You could put send it in this message or leave an envelope at the  or just tell her:     Its a girl

## 2025-01-15 NOTE — TELEPHONE ENCOUNTER
Called patient and informed her that her maternity 21 was negative for down syndrome, trisomy 13 and 18 and that she was having a baby girl.

## 2025-01-16 ENCOUNTER — TELEPHONE (OUTPATIENT)
Dept: OBSTETRICS AND GYNECOLOGY | Facility: CLINIC | Age: 35
End: 2025-01-16
Payer: MEDICAID

## 2025-01-16 ENCOUNTER — PATIENT MESSAGE (OUTPATIENT)
Dept: ADMINISTRATIVE | Facility: OTHER | Age: 35
End: 2025-01-16
Payer: MEDICAID

## 2025-01-16 ENCOUNTER — ROUTINE PRENATAL (OUTPATIENT)
Dept: OBSTETRICS AND GYNECOLOGY | Facility: CLINIC | Age: 35
End: 2025-01-16
Payer: MEDICAID

## 2025-01-16 VITALS — DIASTOLIC BLOOD PRESSURE: 66 MMHG | WEIGHT: 140.63 LBS | BODY MASS INDEX: 27.46 KG/M2 | SYSTOLIC BLOOD PRESSURE: 99 MMHG

## 2025-01-16 DIAGNOSIS — Z3A.12 12 WEEKS GESTATION OF PREGNANCY: Primary | ICD-10-CM

## 2025-01-16 LAB
BILIRUB SERPL-MCNC: NORMAL MG/DL
BLOOD URINE, POC: NORMAL
CLARITY, POC UA: CLEAR
COLOR, POC UA: YELLOW
GLUCOSE UR QL STRIP: NORMAL
KETONES UR QL STRIP: NORMAL
LEUKOCYTE ESTERASE URINE, POC: NORMAL
NITRITE, POC UA: NORMAL
PH, POC UA: 7.5
PROTEIN, POC: NORMAL
SPECIFIC GRAVITY, POC UA: 1.02
UROBILINOGEN, POC UA: NORMAL

## 2025-01-16 PROCEDURE — 99999 PR PBB SHADOW E&M-EST. PATIENT-LVL II: CPT | Mod: PBBFAC,,, | Performed by: OBSTETRICS & GYNECOLOGY

## 2025-01-16 PROCEDURE — 99213 OFFICE O/P EST LOW 20 MIN: CPT | Mod: TH,S$PBB,, | Performed by: OBSTETRICS & GYNECOLOGY

## 2025-01-16 PROCEDURE — 99999PBSHW POCT URINE DIPSTICK WITHOUT MICROSCOPE: Mod: PBBFAC,,,

## 2025-01-16 PROCEDURE — 81002 URINALYSIS NONAUTO W/O SCOPE: CPT | Mod: PBBFAC,PO | Performed by: OBSTETRICS & GYNECOLOGY

## 2025-01-16 PROCEDURE — 99212 OFFICE O/P EST SF 10 MIN: CPT | Mod: PBBFAC,PO | Performed by: OBSTETRICS & GYNECOLOGY

## 2025-01-16 NOTE — TELEPHONE ENCOUNTER
----- Message from Анна sent at 1/14/2025  8:13 AM CST -----  Contact: pt  Type:  Test Results    Who Called: pt   Name of Test (Lab/Mammo/Etc): labs   Date of Test: 1/07  Ordering Provider: Shanelle   Where the test was performed: [facility]   Would the patient rather a call back or a response via [facility]? Call   Best Call Back Number:  [phone]  Additional Information:

## 2025-01-17 NOTE — PROGRESS NOTES
Doing well, no issues     FHT normal    @ 14  wga   IOB labs ordered   Negative MT21, female  Anatomy US ordered   History of , desires repeat   Rh negative, got rhogam in hospital   Unsure partners blood type but ordered for him to do     Face to Face time with patient: 20 minutes of total time spent on the encounter, which includes face to face time and non-face to face time preparing to see the patient (eg, review of tests), Obtaining and/or reviewing separately obtained history, Documenting clinical information in the electronic or other health record, Independently interpreting results (not separately reported) and communicating results to the patient/family/caregiver, or Care coordination (not separately reported).

## 2025-02-08 ENCOUNTER — PATIENT MESSAGE (OUTPATIENT)
Dept: OTHER | Facility: OTHER | Age: 35
End: 2025-02-08
Payer: MEDICAID

## 2025-02-13 ENCOUNTER — ROUTINE PRENATAL (OUTPATIENT)
Dept: OBSTETRICS AND GYNECOLOGY | Facility: CLINIC | Age: 35
End: 2025-02-13
Payer: MEDICAID

## 2025-02-13 VITALS
WEIGHT: 145.31 LBS | HEART RATE: 83 BPM | BODY MASS INDEX: 28.38 KG/M2 | SYSTOLIC BLOOD PRESSURE: 108 MMHG | DIASTOLIC BLOOD PRESSURE: 60 MMHG

## 2025-02-13 DIAGNOSIS — Z3A.16 16 WEEKS GESTATION OF PREGNANCY: Primary | ICD-10-CM

## 2025-02-13 LAB
BILIRUB SERPL-MCNC: NORMAL MG/DL
BLOOD URINE, POC: NORMAL
CLARITY, POC UA: CLEAR
COLOR, POC UA: YELLOW
GLUCOSE UR QL STRIP: NORMAL
KETONES UR QL STRIP: NORMAL
LEUKOCYTE ESTERASE URINE, POC: NORMAL
NITRITE, POC UA: NORMAL
PH, POC UA: 6
PROTEIN, POC: NORMAL
SPECIFIC GRAVITY, POC UA: 1.01
UROBILINOGEN, POC UA: 0.2

## 2025-02-13 PROCEDURE — 99213 OFFICE O/P EST LOW 20 MIN: CPT | Mod: PBBFAC,PO | Performed by: OBSTETRICS & GYNECOLOGY

## 2025-02-13 PROCEDURE — 99999 PR PBB SHADOW E&M-EST. PATIENT-LVL III: CPT | Mod: PBBFAC,,, | Performed by: OBSTETRICS & GYNECOLOGY

## 2025-02-13 PROCEDURE — 99999PBSHW POCT URINE DIPSTICK WITHOUT MICROSCOPE: Mod: PBBFAC,,,

## 2025-02-13 PROCEDURE — 81002 URINALYSIS NONAUTO W/O SCOPE: CPT | Mod: PBBFAC,PO | Performed by: OBSTETRICS & GYNECOLOGY

## 2025-02-13 RX ORDER — VALACYCLOVIR HYDROCHLORIDE 500 MG/1
500 TABLET, FILM COATED ORAL 2 TIMES DAILY PRN
Qty: 30 TABLET | Refills: 1 | Status: SHIPPED | OUTPATIENT
Start: 2025-02-13 | End: 2025-02-16

## 2025-02-13 NOTE — PROGRESS NOTES
Doing well, no issues     FHT normal    @ 16 5 wga   IOB labs done  Negative MT21, female  Anatomy US ordered 3/13  History of , desires repeat   Rh negative, got rhogam in hospital   Unsure partners blood type but ordered for him to do   HSV: valtrex at 36 wga     Face to Face time with patient: 20 minutes of total time spent on the encounter, which includes face to face time and non-face to face time preparing to see the patient (eg, review of tests), Obtaining and/or reviewing separately obtained history, Documenting clinical information in the electronic or other health record, Independently interpreting results (not separately reported) and communicating results to the patient/family/caregiver, or Care coordination (not separately reported).

## 2025-02-15 ENCOUNTER — PATIENT MESSAGE (OUTPATIENT)
Dept: OTHER | Facility: OTHER | Age: 35
End: 2025-02-15
Payer: MEDICAID

## 2025-03-08 ENCOUNTER — PATIENT MESSAGE (OUTPATIENT)
Dept: OTHER | Facility: OTHER | Age: 35
End: 2025-03-08
Payer: MEDICAID

## 2025-03-13 ENCOUNTER — PROCEDURE VISIT (OUTPATIENT)
Dept: MATERNAL FETAL MEDICINE | Facility: CLINIC | Age: 35
End: 2025-03-13
Payer: MEDICAID

## 2025-03-13 ENCOUNTER — ROUTINE PRENATAL (OUTPATIENT)
Dept: OBSTETRICS AND GYNECOLOGY | Facility: CLINIC | Age: 35
End: 2025-03-13
Payer: MEDICAID

## 2025-03-13 VITALS
HEART RATE: 91 BPM | WEIGHT: 147.5 LBS | SYSTOLIC BLOOD PRESSURE: 114 MMHG | DIASTOLIC BLOOD PRESSURE: 61 MMHG | BODY MASS INDEX: 28.8 KG/M2

## 2025-03-13 DIAGNOSIS — Z36.89 ENCOUNTER FOR FETAL ANATOMIC SURVEY: ICD-10-CM

## 2025-03-13 DIAGNOSIS — Z3A.20 20 WEEKS GESTATION OF PREGNANCY: Primary | ICD-10-CM

## 2025-03-13 LAB
BILIRUB SERPL-MCNC: ABNORMAL MG/DL
BLOOD URINE, POC: ABNORMAL
CLARITY, POC UA: CLEAR
COLOR, POC UA: YELLOW
GLUCOSE UR QL STRIP: ABNORMAL
KETONES UR QL STRIP: ABNORMAL
LEUKOCYTE ESTERASE URINE, POC: ABNORMAL
NITRITE, POC UA: ABNORMAL
PH, POC UA: 6
PROTEIN, POC: ABNORMAL
SPECIFIC GRAVITY, POC UA: >1.03
UROBILINOGEN, POC UA: 0.2

## 2025-03-13 PROCEDURE — 76805 OB US >/= 14 WKS SNGL FETUS: CPT | Mod: PBBFAC,PO | Performed by: OBSTETRICS & GYNECOLOGY

## 2025-03-13 PROCEDURE — 99212 OFFICE O/P EST SF 10 MIN: CPT | Mod: PBBFAC,PO | Performed by: OBSTETRICS & GYNECOLOGY

## 2025-03-13 PROCEDURE — 99999PBSHW POCT URINE DIPSTICK WITHOUT MICROSCOPE: Mod: PBBFAC,,,

## 2025-03-13 PROCEDURE — 99999 PR PBB SHADOW E&M-EST. PATIENT-LVL II: CPT | Mod: PBBFAC,,, | Performed by: OBSTETRICS & GYNECOLOGY

## 2025-03-13 PROCEDURE — 81002 URINALYSIS NONAUTO W/O SCOPE: CPT | Mod: PBBFAC,PO | Performed by: OBSTETRICS & GYNECOLOGY

## 2025-03-13 NOTE — PROGRESS NOTES
Doing well, no issues     FHT normal    @ 20 5 wga   IOB labs done  Negative MT21, female  Anatomy US done today   History of , desires repeat   Rh negative, got rhogam in hospital   Unsure partners blood type but ordered for him to do   HSV: valtrex at 36 wga     Face to Face time with patient: 30 minutes of total time spent on the encounter, which includes face to face time and non-face to face time preparing to see the patient (eg, review of tests), Obtaining and/or reviewing separately obtained history, Documenting clinical information in the electronic or other health record, Independently interpreting results (not separately reported) and communicating results to the patient/family/caregiver, or Care coordination (not separately reported).

## 2025-03-14 RX ORDER — PRENATAL WITH FERROUS FUM AND FOLIC ACID 3080; 920; 120; 400; 22; 1.84; 3; 20; 10; 1; 12; 200; 27; 25; 2 [IU]/1; [IU]/1; MG/1; [IU]/1; MG/1; MG/1; MG/1; MG/1; MG/1; MG/1; UG/1; MG/1; MG/1; MG/1; MG/1
1 TABLET ORAL DAILY
Qty: 90 TABLET | Refills: 3 | Status: SHIPPED | OUTPATIENT
Start: 2025-03-14 | End: 2026-03-14

## 2025-03-14 NOTE — TELEPHONE ENCOUNTER
Refill Routing Note   Medication(s) are not appropriate for processing by [facility] for the following reason(s):        Outside of protocol    [facility] action(s):  Route               Appointments  past 12m or future 3m with PCP    Date Provider   Last Visit   3/13/2025 Kelli Timmons MD   Next Visit   4/10/2025 Kelli Timmons MD   ED visits in past 90 days: 0        Note composed:12:15 PM 03/14/2025

## 2025-03-17 ENCOUNTER — LAB VISIT (OUTPATIENT)
Dept: LAB | Facility: HOSPITAL | Age: 35
End: 2025-03-17
Attending: OBSTETRICS & GYNECOLOGY
Payer: MEDICAID

## 2025-03-17 DIAGNOSIS — Z3A.20 20 WEEKS GESTATION OF PREGNANCY: ICD-10-CM

## 2025-03-17 LAB — GLUCOSE SERPL-MCNC: 87 MG/DL (ref 70–140)

## 2025-03-17 PROCEDURE — 36415 COLL VENOUS BLD VENIPUNCTURE: CPT | Performed by: OBSTETRICS & GYNECOLOGY

## 2025-03-17 PROCEDURE — 82950 GLUCOSE TEST: CPT | Performed by: OBSTETRICS & GYNECOLOGY

## 2025-03-17 RX ORDER — PRENATAL WITH FERROUS FUM AND FOLIC ACID 3080; 920; 120; 400; 22; 1.84; 3; 20; 10; 1; 12; 200; 27; 25; 2 [IU]/1; [IU]/1; MG/1; [IU]/1; MG/1; MG/1; MG/1; MG/1; MG/1; MG/1; UG/1; MG/1; MG/1; MG/1; MG/1
1 TABLET ORAL DAILY
Qty: 90 TABLET | Refills: 3 | OUTPATIENT
Start: 2025-03-17 | End: 2026-03-17

## 2025-03-17 NOTE — TELEPHONE ENCOUNTER
Refill Decision Note   Elvia Nassar  is requesting a refill authorization.  Brief Assessment and Rationale for Refill:  Quick Discontinue     Medication Therapy Plan: Receipt confirmed by pharmacy (3/14/2025 12:26 PM CDT)      Pharmacist review requested: Yes   Comments:     Note composed:3:26 PM 03/17/2025

## 2025-03-17 NOTE — TELEPHONE ENCOUNTER
----- Message from Nakul sent at 3/14/2025 12:59 PM CDT -----  Contact: pt  Type: Requesting to speak with nurseNewo Called: PTRegarding: questions about  US appointment Would the patient rather a call back or a response via [facility]? Call [facility] Call Back Number: [phone] Additional Information:

## 2025-03-17 NOTE — TELEPHONE ENCOUNTER
Called pt w/  to answer her question regarding her upcoming US. LVM letting patient know that she can message us through the portal with any questions she may have.

## 2025-03-17 NOTE — TELEPHONE ENCOUNTER
Refill Routing Note   Medication(s) are not appropriate for processing by [facility] for the following reason(s):        Outside of protocol-duplicate to [facility]    [facility] action(s):  Defer        Medication Therapy Plan: Duplcate Rx order. Confirmed filled at pharmacy. Other notes in this message have been completed.    Pharmacist review requested: Yes     Appointments  past 12m or future 3m with PCP    Date Provider   Last Visit   3/13/2025 Kelli Timmons MD   Next Visit   4/10/2025 Kelli Timmons MD   ED visits in past 90 days: 0        Note composed:2:24 PM 03/17/2025

## 2025-03-18 ENCOUNTER — TELEPHONE (OUTPATIENT)
Dept: OBSTETRICS AND GYNECOLOGY | Facility: CLINIC | Age: 35
End: 2025-03-18
Payer: MEDICAID

## 2025-03-18 ENCOUNTER — RESULTS FOLLOW-UP (OUTPATIENT)
Dept: OBSTETRICS AND GYNECOLOGY | Facility: CLINIC | Age: 35
End: 2025-03-18

## 2025-03-18 ENCOUNTER — PATIENT MESSAGE (OUTPATIENT)
Dept: OBSTETRICS AND GYNECOLOGY | Facility: CLINIC | Age: 35
End: 2025-03-18
Payer: MEDICAID

## 2025-03-18 NOTE — TELEPHONE ENCOUNTER
Please let her know    The anatomy ultrasound looked good however there were some views of the baby's heart and spine that we couldn't see as well as we would like. We don't see anything that looks wrong or concerning but we don't want to miss anything so I recommend repeating the ultrasound in 4-6 weeks. They have it set up on 4/10 at 11. Please let us know if that doesn't work.

## 2025-03-31 ENCOUNTER — TELEPHONE (OUTPATIENT)
Dept: OBSTETRICS AND GYNECOLOGY | Facility: CLINIC | Age: 35
End: 2025-03-31
Payer: MEDICAID

## 2025-03-31 NOTE — TELEPHONE ENCOUNTER
Spoke with patient, informed her appointments are correct for 4/10----- Message from Di sent at 3/28/2025 11:42 AM CDT -----  Type:  Needs Medical AdviceWho Called: pt BARBARA DE DIOS [[id number]]Would the patient rather a call back or a response via [facility]? Call [facility] Call Back Number: [phone] Additional Information: pt requesting a cll back in regards to time frame for US and OB appt wants to know if time frame needs to be change on OB appt because scheduled at the same time

## 2025-04-05 ENCOUNTER — PATIENT MESSAGE (OUTPATIENT)
Dept: OTHER | Facility: OTHER | Age: 35
End: 2025-04-05
Payer: MEDICAID

## 2025-04-10 ENCOUNTER — PATIENT MESSAGE (OUTPATIENT)
Dept: MATERNAL FETAL MEDICINE | Facility: CLINIC | Age: 35
End: 2025-04-10
Payer: MEDICAID

## 2025-04-10 ENCOUNTER — ROUTINE PRENATAL (OUTPATIENT)
Dept: OBSTETRICS AND GYNECOLOGY | Facility: CLINIC | Age: 35
End: 2025-04-10
Payer: MEDICAID

## 2025-04-10 ENCOUNTER — PATIENT MESSAGE (OUTPATIENT)
Dept: OBSTETRICS AND GYNECOLOGY | Facility: CLINIC | Age: 35
End: 2025-04-10

## 2025-04-10 ENCOUNTER — PROCEDURE VISIT (OUTPATIENT)
Dept: MATERNAL FETAL MEDICINE | Facility: CLINIC | Age: 35
End: 2025-04-10
Payer: MEDICAID

## 2025-04-10 VITALS
BODY MASS INDEX: 29.8 KG/M2 | SYSTOLIC BLOOD PRESSURE: 106 MMHG | WEIGHT: 152.63 LBS | HEART RATE: 93 BPM | DIASTOLIC BLOOD PRESSURE: 69 MMHG

## 2025-04-10 DIAGNOSIS — Z36.89 ENCOUNTER FOR FETAL ANATOMIC SURVEY: ICD-10-CM

## 2025-04-10 DIAGNOSIS — O33.7XX0 FETAL ASCITES CAUSING DISPROPORTION: ICD-10-CM

## 2025-04-10 DIAGNOSIS — Z3A.24 24 WEEKS GESTATION OF PREGNANCY: Primary | ICD-10-CM

## 2025-04-10 PROCEDURE — 99213 OFFICE O/P EST LOW 20 MIN: CPT | Mod: PBBFAC,TH,PO | Performed by: OBSTETRICS & GYNECOLOGY

## 2025-04-10 PROCEDURE — 99999 PR PBB SHADOW E&M-EST. PATIENT-LVL III: CPT | Mod: PBBFAC,,, | Performed by: OBSTETRICS & GYNECOLOGY

## 2025-04-10 PROCEDURE — 76816 OB US FOLLOW-UP PER FETUS: CPT | Mod: PBBFAC,PO | Performed by: OBSTETRICS & GYNECOLOGY

## 2025-04-10 NOTE — PROGRESS NOTES
Doing well, no issues     FHT normal    @ 24 5/7 wga   IOB labs done  Negative MT21, female  Anatomy US done today   History of , desires repeat   Rh negative, got rhogam in hospital   Unsure partners blood type but ordered for him to do   H/o HSV: valtrex at 36 wga   Baby noted to have ascitics on repeat US, no h/o infection, had rhogam this pregnancy but no other antibodies, still limited cardiac views, discussed with mom possibility of causes idiopathic, infectious, antibodies/fetal anemia, chromosomal, a defect in heart or bowel. Discussed with MFM and they will see her. Doesn't feel needs TORCH titers now.     Face to Face time with patient: 30 minutes of total time spent on the encounter, which includes face to face time and non-face to face time preparing to see the patient (eg, review of tests), Obtaining and/or reviewing separately obtained history, Documenting clinical information in the electronic or other health record, Independently interpreting results (not separately reported) and communicating results to the patient/family/caregiver, or Care coordination (not separately reported).

## 2025-04-13 PROBLEM — O28.3 ABNORMAL PRENATAL ULTRASOUND: Status: ACTIVE | Noted: 2025-04-13

## 2025-04-14 ENCOUNTER — PROCEDURE VISIT (OUTPATIENT)
Dept: MATERNAL FETAL MEDICINE | Facility: CLINIC | Age: 35
End: 2025-04-14
Payer: MEDICAID

## 2025-04-14 ENCOUNTER — OFFICE VISIT (OUTPATIENT)
Dept: MATERNAL FETAL MEDICINE | Facility: CLINIC | Age: 35
End: 2025-04-14
Payer: MEDICAID

## 2025-04-14 ENCOUNTER — TELEPHONE (OUTPATIENT)
Dept: OBSTETRICS AND GYNECOLOGY | Facility: CLINIC | Age: 35
End: 2025-04-14
Payer: MEDICAID

## 2025-04-14 ENCOUNTER — LAB VISIT (OUTPATIENT)
Dept: LAB | Facility: OTHER | Age: 35
End: 2025-04-14
Attending: STUDENT IN AN ORGANIZED HEALTH CARE EDUCATION/TRAINING PROGRAM
Payer: MEDICAID

## 2025-04-14 VITALS
BODY MASS INDEX: 30.23 KG/M2 | WEIGHT: 154.75 LBS | SYSTOLIC BLOOD PRESSURE: 124 MMHG | DIASTOLIC BLOOD PRESSURE: 71 MMHG

## 2025-04-14 DIAGNOSIS — O33.7XX0 FETAL ASCITES CAUSING DISPROPORTION: ICD-10-CM

## 2025-04-14 DIAGNOSIS — O28.3 ABNORMAL PRENATAL ULTRASOUND: Primary | ICD-10-CM

## 2025-04-14 DIAGNOSIS — Z36.89 ENCOUNTER FOR ULTRASOUND TO ASSESS FETAL GROWTH: ICD-10-CM

## 2025-04-14 DIAGNOSIS — O28.3 ABNORMAL PRENATAL ULTRASOUND: ICD-10-CM

## 2025-04-14 LAB
INDIRECT COOMBS: NORMAL
RH BLD: NORMAL
SPECIMEN OUTDATE: NORMAL

## 2025-04-14 PROCEDURE — 86747 PARVOVIRUS ANTIBODY: CPT

## 2025-04-14 PROCEDURE — 3008F BODY MASS INDEX DOCD: CPT | Mod: CPTII,,, | Performed by: OBSTETRICS & GYNECOLOGY

## 2025-04-14 PROCEDURE — 99999 PR PBB SHADOW E&M-EST. PATIENT-LVL III: CPT | Mod: PBBFAC,,, | Performed by: OBSTETRICS & GYNECOLOGY

## 2025-04-14 PROCEDURE — 1159F MED LIST DOCD IN RCRD: CPT | Mod: CPTII,,, | Performed by: OBSTETRICS & GYNECOLOGY

## 2025-04-14 PROCEDURE — 76816 OB US FOLLOW-UP PER FETUS: CPT | Mod: PBBFAC | Performed by: OBSTETRICS & GYNECOLOGY

## 2025-04-14 PROCEDURE — 3078F DIAST BP <80 MM HG: CPT | Mod: CPTII,,, | Performed by: OBSTETRICS & GYNECOLOGY

## 2025-04-14 PROCEDURE — 86777 TOXOPLASMA ANTIBODY: CPT

## 2025-04-14 PROCEDURE — 86900 BLOOD TYPING SEROLOGIC ABO: CPT | Performed by: STUDENT IN AN ORGANIZED HEALTH CARE EDUCATION/TRAINING PROGRAM

## 2025-04-14 PROCEDURE — 99203 OFFICE O/P NEW LOW 30 MIN: CPT | Mod: S$PBB,TH,, | Performed by: OBSTETRICS & GYNECOLOGY

## 2025-04-14 PROCEDURE — 36415 COLL VENOUS BLD VENIPUNCTURE: CPT

## 2025-04-14 PROCEDURE — 3074F SYST BP LT 130 MM HG: CPT | Mod: CPTII,,, | Performed by: OBSTETRICS & GYNECOLOGY

## 2025-04-14 PROCEDURE — 86778 TOXOPLASMA ANTIBODY IGM: CPT

## 2025-04-14 PROCEDURE — 76816 OB US FOLLOW-UP PER FETUS: CPT | Mod: 26,S$PBB,, | Performed by: OBSTETRICS & GYNECOLOGY

## 2025-04-14 PROCEDURE — 99213 OFFICE O/P EST LOW 20 MIN: CPT | Mod: PBBFAC,TH | Performed by: OBSTETRICS & GYNECOLOGY

## 2025-04-14 NOTE — ASSESSMENT & PLAN NOTE
Ascities noted on follow up anatomy. No other abnormalities noted. Rh neg with previous negative T&S. Denies any concerning illnesses in pregnancy. cfDNA low risk.     On today's ultrasound fetal ascites remains present, albeit less then previous. There is no other abnormal fluid collection. There is no evidence of fetal anemia.    Patient was counseled regarding today's ultrasound finding of fetal ascites.  The patient's medical and OB hx were reviewed and are noncontributory. She denies any viral or febrile illnesses during this pregnancy. The patient has had cfDNA testing with low risk results.     On today's exam, there is no evidence of fetal ascites, bowel abnormality, hydrops, or any other fetal structural abnormality. Fetal ascites may occur for various etiological reasons including genitourinary, gastrointestinal, viral/bacterial infection, cardiac anomalies such as a structural defect or arrhythmia. The potential etiologies for fetal ascites were reviewed:  1. Down Syndrome or Han Syndrome  2. Viral infection (if viral, CMV most likely, though can include hepatitis, parvovirus)  3. Cystic fibrosis  4. Bowel perforation  5. Other GI pathology  6. Precursor to fetal hydrops  7.  pathology, such as bladder or renal pelvis rupture with subsequent urinary ascites (not suspected based on appearance of the fetal  bladder or kidneys)  8. Idiopathic    We discussed the potential utility and limitations of undergoing testing for viral etiology.  We also reviewed the option of genetic amniocentesis for diagnosis of fetal cytogenetic abnormalities and for diagnostic testing for CMV infection and cystic fibrosis. The patient does not desire diagnostic testing at this juncture, but will consider it if any serological evaluation is concerning or if there are new ultrasound findings. We also reviewed the recommendation for serial ultrasound exams to assess for continued resolution of ascites, to assess for appearance  of the fetal bowel, and to assess fetal growth. We also discussed that although we have a low suspicion for cardiac pathology, will have her seen by peds cards colleagues out of an abundance of caution.     Recommendations:  Serological testing for viral cause ordered today  Peds cards referral placed  Recommend f/u growth studies at 28 and 34 weeks, or more frequent if indicated.

## 2025-04-14 NOTE — PROGRESS NOTES
MATERNAL-FETAL MEDICINE   CONSULT NOTE    Provider requesting consultation: Shanelle    SUBJECTIVE:   Ms. Elvia Nassar is a 35 y.o.  female with IUP at 25w1d who is seen in consultation by JONESM for evaluation and management of fetal ascites    Current Medications[1]  Past Medical History[2], Past Surgical History[3]  Allergies, family and social history: see chart    OB History    Para Term  AB Living   2 1 1   1   SAB IAB Ectopic Multiple Live Births       1      # Outcome Date GA Lbr Keshav/2nd Weight Sex Type Anes PTL Lv   2 Current            1 Term 10/11/07 40w0d  2.25 kg (4 lb 15.4 oz) M CS-Unspec   ANGELA      Birth Comments: Pelvimetry performed in [facility], was told she cannot labor     Genetic history: The patient denies any inherited genetic diseases or birth defects in herself or her partner's personal history or family.    Objective:   LMP 10/18/2024     Ultrasound performed. See viewpoint for full ultrasound report.  Schwarz IUP with cardiac activity is identified.  Again, fetal ascites is noted. This appears subjectively less than previous. There is no evidence of hydrops fetalis. The bowel is hyperechoic without dilation. Otherwise, there are no structural anomalies identified. The fetal anatomic survey was completed today.   Interval fetal growth has been normal (EFW 39%, AC 42%).  Middle cerebral artery Doppler peak systolic velocity measures normal at 0.77 MoM,   MVP is normal.     Significant labs/imaging:  Cystic fibrosis carrier screening: negative    Lab Results   Component Value Date    TSC33D19 Negative 2025    T18 Negative 2025    VGB53T73 Negative 2025    MONOSOMYXRES Not Detected 2025     ASSESSMENT/PLAN:   35 y.o.  female with IUP at 25w1d   Assessment & Plan  Abnormal prenatal ultrasound  Ascities noted on follow up anatomy. No other abnormalities noted. Rh neg with previous negative T&S. Denies any concerning illnesses in  pregnancy. cfDNA low risk.     On today's ultrasound fetal ascites remains present, albeit less then previous. There is no other abnormal fluid collection. There is no evidence of fetal anemia.    Patient was counseled regarding today's ultrasound finding of fetal ascites.  The patient's medical and OB hx were reviewed and are noncontributory. She denies any viral or febrile illnesses during this pregnancy. The patient has had cfDNA testing with low risk results.     On today's exam, there is no evidence of fetal ascites, bowel abnormality, hydrops, or any other fetal structural abnormality. Fetal ascites may occur for various etiological reasons including genitourinary, gastrointestinal, viral/bacterial infection, cardiac anomalies such as a structural defect or arrhythmia. The potential etiologies for fetal ascites were reviewed:  1. Down Syndrome or Han Syndrome  2. Viral infection (if viral, CMV most likely, though can include hepatitis, parvovirus)  3. Cystic fibrosis  4. Bowel perforation  5. Other GI pathology  6. Precursor to fetal hydrops  7.  pathology, such as bladder or renal pelvis rupture with subsequent urinary ascites (not suspected based on appearance of the fetal  bladder or kidneys)  8. Idiopathic    We discussed the potential utility and limitations of undergoing testing for viral etiology.  We also reviewed the option of genetic amniocentesis for diagnosis of fetal cytogenetic abnormalities and for diagnostic testing for CMV infection and cystic fibrosis. The patient does not desire diagnostic testing at this juncture, but will consider it if any serological evaluation is concerning or if there are new ultrasound findings. We also reviewed the recommendation for serial ultrasound exams to assess for continued resolution of ascites, to assess for appearance of the fetal bowel, and to assess fetal growth. We also discussed that although we have a low suspicion for cardiac pathology, will have  her seen by peds cards colleagues out of an abundance of caution.     Recommendations:  Serological testing for viral cause ordered today  Peds cards referral placed  Recommend f/u growth studies at 28 and 34 weeks, or more frequent if indicated.    FOLLOW UP:   F/u in 3 weeks for US/MD visit    GAVINO Freeman MD  Maternal Fetal Medicine Fellow   PGY-6      ATTENDING ATTESTATION  I have seen the patient and reviewed Dr. Freeman's consultation note, assessment and plan. I have personally spoken to and discussed plan with the patient and agree with the findings. All changes made to the body of the note.    FOLLOW UP:   A follow up ultrasound was made for 3 weeks from today. A follow up MFM MD visit was made for same day.    Patient was counseled that prenatal ultrasound studies have limitations. They do not detect all fetal, genetic, placental, and maternal abnormalities. A normal appearing prenatal ultrasound is reassuring. However, it does not guarantee the absence of an abnormality or predict a normal outcome for the fetus or the mother.     The patient was given an opportunity to ask questions about the management of her high risk pregnancy problems. She expressed an understanding of and agreement to the above impression and plan. All questions were answered to her satisfaction.    30 minutes of total time spent on the encounter, which includes face to face time and non-face to face time preparing to see the patient (eg, review of tests), obtaining and/or reviewing separately obtained history, documenting clinical information in the electronic or other health record, independently interpreting results (not separately reported) and communicating results to the patient/family/caregiver, or care coordination (not separately reported).        Danie Ocampo MD   Maternal-Fetal Medicine      Electronically Signed by Danie Ocampo April 14, 2025        [1]   Current Outpatient Medications   Medication Sig Dispense  Refill    PNV,calcium 72/iron/folic acid (PRENATAL VITAMIN) Tab Take 1 tablet by mouth once daily. 90 tablet 3    valACYclovir (VALTREX) 500 MG tablet Take 1 tablet (500 mg total) by mouth 2 (two) times daily as needed (outbreak). 30 tablet 1     No current facility-administered medications for this visit.   [2] No past medical history on file.  [3]   Past Surgical History:  Procedure Laterality Date     SECTION      Reports she could not labor dur to pelvimetry in [facility]

## 2025-04-15 ENCOUNTER — CLINICAL SUPPORT (OUTPATIENT)
Dept: PEDIATRIC CARDIOLOGY | Facility: CLINIC | Age: 35
End: 2025-04-15
Payer: MEDICAID

## 2025-04-15 ENCOUNTER — OFFICE VISIT (OUTPATIENT)
Dept: PEDIATRIC CARDIOLOGY | Facility: CLINIC | Age: 35
End: 2025-04-15
Attending: PEDIATRICS
Payer: MEDICAID

## 2025-04-15 VITALS
HEART RATE: 107 BPM | SYSTOLIC BLOOD PRESSURE: 108 MMHG | WEIGHT: 154.75 LBS | BODY MASS INDEX: 30.38 KG/M2 | HEIGHT: 60 IN | DIASTOLIC BLOOD PRESSURE: 67 MMHG

## 2025-04-15 DIAGNOSIS — O28.3 ABNORMAL PRENATAL ULTRASOUND: Primary | ICD-10-CM

## 2025-04-15 DIAGNOSIS — Z36.83 ENCOUNTER FOR SCREENING FOR CONGENITAL CARDIAC ABNORMALITIES IN FETUS: ICD-10-CM

## 2025-04-15 DIAGNOSIS — O33.7XX0 FETAL ASCITES CAUSING DISPROPORTION: ICD-10-CM

## 2025-04-15 DIAGNOSIS — O28.3 ABNORMAL PRENATAL ULTRASOUND: ICD-10-CM

## 2025-04-15 PROCEDURE — 3074F SYST BP LT 130 MM HG: CPT | Mod: CPTII,,, | Performed by: PEDIATRICS

## 2025-04-15 PROCEDURE — 3078F DIAST BP <80 MM HG: CPT | Mod: CPTII,,, | Performed by: PEDIATRICS

## 2025-04-15 PROCEDURE — 76825 ECHO EXAM OF FETAL HEART: CPT | Mod: 26,S$PBB,, | Performed by: PEDIATRICS

## 2025-04-15 PROCEDURE — 3008F BODY MASS INDEX DOCD: CPT | Mod: CPTII,,, | Performed by: PEDIATRICS

## 2025-04-15 PROCEDURE — 76827 ECHO EXAM OF FETAL HEART: CPT | Mod: 26,S$PBB,, | Performed by: PEDIATRICS

## 2025-04-15 PROCEDURE — 93325 DOPPLER ECHO COLOR FLOW MAPG: CPT | Mod: PBBFAC | Performed by: PEDIATRICS

## 2025-04-15 PROCEDURE — 76827 ECHO EXAM OF FETAL HEART: CPT | Mod: PBBFAC | Performed by: PEDIATRICS

## 2025-04-15 PROCEDURE — 1160F RVW MEDS BY RX/DR IN RCRD: CPT | Mod: CPTII,,, | Performed by: PEDIATRICS

## 2025-04-15 PROCEDURE — 99204 OFFICE O/P NEW MOD 45 MIN: CPT | Mod: 25,S$PBB,, | Performed by: PEDIATRICS

## 2025-04-15 PROCEDURE — 76825 ECHO EXAM OF FETAL HEART: CPT | Mod: PBBFAC | Performed by: PEDIATRICS

## 2025-04-15 PROCEDURE — 99999 PR PBB SHADOW E&M-EST. PATIENT-LVL III: CPT | Mod: PBBFAC,,, | Performed by: PEDIATRICS

## 2025-04-15 PROCEDURE — 99213 OFFICE O/P EST LOW 20 MIN: CPT | Mod: PBBFAC | Performed by: PEDIATRICS

## 2025-04-15 PROCEDURE — 93325 DOPPLER ECHO COLOR FLOW MAPG: CPT | Mod: 26,S$PBB,, | Performed by: PEDIATRICS

## 2025-04-15 PROCEDURE — 1159F MED LIST DOCD IN RCRD: CPT | Mod: CPTII,,, | Performed by: PEDIATRICS

## 2025-04-15 NOTE — PROGRESS NOTES
I had the pleasure of evaluating Elvia Nassar who is now 35 y.o.  and carrying her 2nd pregnancy at 25 3/7 weeks gestation. She was referred for evaluation of the fetal heart due to increased risks for congenital heart disease associated with fetal ascites. Maternal cell free DNA is negative and mother she has been screened for cystic fibrosis. [facility] has not yet identified an etiology for the ascites observed and fortunately it appears to be improving.    I was assisted throughout this encounter by our online translation services using an iPad.    Current Medications[1]  Review of patient's allergies indicates:  No Known Allergies    Maternal factors increasing risk for congenital heart disease: None identified.  Paternal factors increasing risk for congenital heart disease:  None identified.    FETAL ECHOCARDIOGRAM (preliminary):    Normal fetal cardiac connections and function for estimated gestational age.  No obvious ascites noted on this study.  (Full report in electronic medical record)    I reviewed the strengths and weaknesses of fetal echocardiography including the insufficient sensitivity to rule out many septal defects, anomalies of pulmonary and systemic veins, arch anomalies, and some valvar abnormalities. I also discussed that  resolution of the ductus arteriosus and foramen ovale (normal fetal structures) cannot be assured by fetal evaluation. Finally, I reviewed today's echocardiogram that demonstrates normal anatomical connections and function for the estimated gestational age. Within the limitations imposed by fetal physiology, I would expect a high probability that this infant will be born with a normal heart.  With the quality of the study obtained today and no other identifiable risk factors for congenital heart disease, I do not think that an echocardiogram is necessary following delivery unless the clinical examination is abnormal.    Ms. Vidal Nassar was referred for  evaluation due to the presence of fetal ascites that appears to be a resolving.  Fetal ascites has multiple potential etiologies and not as yet been identified.  The Maternal Fetal studies demonstrated no specific cardiac abnormalities but they felt that it was important to rule out a congenital heart problem that potentially could be the etiology for the effusion that is observed.  No significant hemodynamic abnormality could be identified on this study.      The  assured me that I answered all the mother's questions. I provided an information sheet reviewing the fetal physiology and how this compares to normal  physiology translated into Yoruba.. This includes a reiteration of the limitations of fetal echocardiography that I have discussed today.  Ms. Vidal Nassar does have some ability to read English and also has acquaintances who can help her with English translation so I also suggested that she review the information related to fetal echocardiography available at the [employer] website and provided directions for accessing this site.  I encouraged to call any additional questions in the [facility] program we will assist in translation of any of her concerns.  I have not scheduled another evaluation; however, if questions arise later during gestation or after delivery, I would be happy to assist in any way. Ms. Vidal Nassar is comfortable with the plan.      RECOMMENDATIONS:  Cardiac evaluation of the infant after delivery if the clinical exam is abnormal.                45 minutes of total time spent on the encounter, which includes face to face time and non-face to face time preparing to see the patient (eg, review of tests), obtaining and/or reviewing separately obtained history, documenting clinical information in the electronic or other health record, independently interpreting results (not separately reported) and communicating results to  the patient/family/caregiver, or care coordination (not separately reported).           [1]   Current Outpatient Medications:     PNV,calcium 72/iron/folic acid (PRENATAL VITAMIN) Tab, Take 1 tablet by mouth once daily., Disp: 90 tablet, Rfl: 3    valACYclovir (VALTREX) 500 MG tablet, Take 1 tablet (500 mg total) by mouth 2 (two) times daily as needed (outbreak)., Disp: 30 tablet, Rfl: 1

## 2025-04-15 NOTE — LETTER
April 15, 2025        Kelli Timmons MD  [address]  [place] [address]             [facility] [place] - Maternal Fetal Medicine  [address] [facility]  [place] [address]  Phone: [phone]  Fax: [phone]   Patient: Elvia Nassar   MR Number: [id number]   YOB: 1990   Date of Visit: 4/15/2025       Dear Dr. Timmons:    Thank you for referring Elvia Nassar to me for evaluation. Below are the relevant portions of my assessment and plan of care.            If you have questions, please do not hesitate to call me. I look forward to following Elvia along with you.    Sincerely,      Alex Rowland MD           CC  No Recipients      impaired balance/cognition/pain/impaired postural control/decreased ROM/decreased strength

## 2025-04-16 LAB
T GONDII IGG SER QL IA: REACTIVE
T GONDII IGG SERPL IA-ACNC: 20.8 IU/ML

## 2025-04-17 ENCOUNTER — RESULTS FOLLOW-UP (OUTPATIENT)
Dept: MATERNAL FETAL MEDICINE | Facility: CLINIC | Age: 35
End: 2025-04-17

## 2025-04-17 LAB
B19V IGG SER QL IA: NEGATIVE
B19V IGG+IGM SER-IMP: NORMAL
B19V IGM SER QL IA: NEGATIVE
W TOXOPLASMA IGM ANTIBODY: <3 AU/ML

## 2025-04-19 ENCOUNTER — PATIENT MESSAGE (OUTPATIENT)
Dept: OTHER | Facility: OTHER | Age: 35
End: 2025-04-19
Payer: MEDICAID

## 2025-05-03 ENCOUNTER — PATIENT MESSAGE (OUTPATIENT)
Dept: OTHER | Facility: OTHER | Age: 35
End: 2025-05-03
Payer: MEDICAID

## 2025-05-05 ENCOUNTER — PROCEDURE VISIT (OUTPATIENT)
Dept: MATERNAL FETAL MEDICINE | Facility: CLINIC | Age: 35
End: 2025-05-05
Payer: MEDICAID

## 2025-05-05 ENCOUNTER — OFFICE VISIT (OUTPATIENT)
Dept: MATERNAL FETAL MEDICINE | Facility: CLINIC | Age: 35
End: 2025-05-05
Payer: MEDICAID

## 2025-05-05 VITALS — SYSTOLIC BLOOD PRESSURE: 106 MMHG | WEIGHT: 158.5 LBS | DIASTOLIC BLOOD PRESSURE: 63 MMHG | BODY MASS INDEX: 30.96 KG/M2

## 2025-05-05 DIAGNOSIS — O28.3 ABNORMAL PRENATAL ULTRASOUND: ICD-10-CM

## 2025-05-05 DIAGNOSIS — O33.7XX0 FETAL ASCITES CAUSING DISPROPORTION: ICD-10-CM

## 2025-05-05 DIAGNOSIS — O28.3 ABNORMAL PRENATAL ULTRASOUND: Primary | ICD-10-CM

## 2025-05-05 DIAGNOSIS — Z36.89 ENCOUNTER FOR ULTRASOUND TO ASSESS FETAL GROWTH: ICD-10-CM

## 2025-05-05 DIAGNOSIS — O33.7XX0 FETAL ASCITES CAUSING DISPROPORTION: Primary | ICD-10-CM

## 2025-05-05 PROCEDURE — 99999 PR PBB SHADOW E&M-EST. PATIENT-LVL II: CPT | Mod: PBBFAC,,, | Performed by: OBSTETRICS & GYNECOLOGY

## 2025-05-05 PROCEDURE — 99213 OFFICE O/P EST LOW 20 MIN: CPT | Mod: S$PBB,TH,, | Performed by: OBSTETRICS & GYNECOLOGY

## 2025-05-05 PROCEDURE — 3078F DIAST BP <80 MM HG: CPT | Mod: CPTII,,, | Performed by: OBSTETRICS & GYNECOLOGY

## 2025-05-05 PROCEDURE — 76816 OB US FOLLOW-UP PER FETUS: CPT | Mod: PBBFAC | Performed by: OBSTETRICS & GYNECOLOGY

## 2025-05-05 PROCEDURE — 3074F SYST BP LT 130 MM HG: CPT | Mod: CPTII,,, | Performed by: OBSTETRICS & GYNECOLOGY

## 2025-05-05 PROCEDURE — 96041 GENETIC COUNSELING SVC EA 30: CPT | Mod: ,,, | Performed by: GENETIC COUNSELOR, MS

## 2025-05-05 PROCEDURE — 99212 OFFICE O/P EST SF 10 MIN: CPT | Mod: PBBFAC,TH | Performed by: OBSTETRICS & GYNECOLOGY

## 2025-05-05 PROCEDURE — 3008F BODY MASS INDEX DOCD: CPT | Mod: CPTII,,, | Performed by: OBSTETRICS & GYNECOLOGY

## 2025-05-05 PROCEDURE — 1159F MED LIST DOCD IN RCRD: CPT | Mod: CPTII,,, | Performed by: OBSTETRICS & GYNECOLOGY

## 2025-05-05 PROCEDURE — 76816 OB US FOLLOW-UP PER FETUS: CPT | Mod: 26,S$PBB,, | Performed by: OBSTETRICS & GYNECOLOGY

## 2025-05-05 NOTE — PROGRESS NOTES
Maternal Fetal Medicine follow up consult    Patient is accompanied by her son     SUBJECTIVE:     Elvia Nassar is a 35 y.o.  female with IUP at 28w2d who is seen in follow up consultation by MFM.  Pregnancy complications include:   No problems updated.  Previous notes reviewed.   No changes to medical, surgical, family, social, or obstetric history.    Interval history since last MFM visit:   Patient has no complaints today. She is overall feeling well.  Patient denies any contractions/cramping, vaginal bleeding or leakage of fluid.  She reports good fetal movement.    Medications:  Current Outpatient Medications   Medication Instructions    PNV,calcium 72/iron/folic acid (PRENATAL VITAMIN) Tab 1 tablet, Oral, Daily    valACYclovir (VALTREX) 500 mg, Oral, 2 times daily PRN     Care team members:  Shanelle - Primary OB     OBJECTIVE:   /63 (BP Location: Left arm, Patient Position: Sitting)   Wt 71.9 kg (158 lb 8.2 oz)   LMP 10/18/2024   BMI 30.96 kg/m²     Physical Exam:  Deferred    Ultrasound performed. See viewpoint for full ultrasound report.  Schwarz live IUP  Fetal size is appropriate for gestational age, with the EFW (1141 g) plotting at the 37% and the AC plotting at the 27%.   A limited repeat fetal anatomic survey appears normal. Ascites was no longer seen today. A prominent transverse colon is seen, however this is measuring normal.  The MVP is normal.  Presentation is cephalic     Significant labs/imaging:  Lab Results   Component Value Date    XEG80V27 Negative 2025    T18 Negative 2025    BMG88Y50 Negative 2025    MONOSOMYXRES Not Detected 2025       ASSESSMENT/PLAN:     35 y.o.  female with IUP at 28w2d    Assessment & Plan  Abnormal prenatal ultrasound  Please see original consult for full counseling and recommendations   We discussed today's ultrasound that overall demonstrates resolution of previously seen ascites. No other abnormalities were  noted today.  Given the prior ultrasound, would recommend that we continue to monitor closely.  Fetal ECHO previously found to be WNL    Recommendations:  Growth in 4 weeks weeks with MD visit  Prenatal care per other comorbidities  No specific recommendations for prenatal testing based on this problem.  Mode and timing of delivery per other OB indications.        Patient was counseled that prenatal ultrasound studies have limitations. They do not detect all fetal, genetic, placental, and maternal abnormalities. A normal appearing prenatal ultrasound is reassuring. However, it does not guarantee the absence of an abnormality or predict a normal outcome for the fetus or the mother.     FOLLOW UP:   A follow up ultrasound will be made for 4 weeks from today with a follow up MFM MD visit.    The patient was given an opportunity to ask questions about the management of her high risk pregnancy problems. She expressed an understanding of and agreement to the above impression and plan. All questions were answered to her satisfaction.    20 minutes of total time spent on the encounter, which includes face to face time and non-face to face time preparing to see the patient (eg, review of tests), obtaining and/or reviewing separately obtained history, documenting clinical information in the electronic or other health record, independently interpreting results (not separately reported) and communicating results to the patient/family/caregiver, or care coordination (not separately reported).        Danie Ocampo MD   Maternal-Fetal Medicine      Electronically Signed by Danie Ocampo May 5, 2025

## 2025-05-05 NOTE — ASSESSMENT & PLAN NOTE
Please see original consult for full counseling and recommendations   We discussed today's ultrasound that overall demonstrates resolution of previously seen ascites. No other abnormalities were noted today.  Given the prior ultrasound, would recommend that we continue to monitor closely.  Fetal ECHO previously found to be WNL    Recommendations:  Growth in 4 weeks weeks with MD visit  Prenatal care per other comorbidities  No specific recommendations for prenatal testing based on this problem.  Mode and timing of delivery per other OB indications.

## 2025-05-05 NOTE — PROGRESS NOTES
Office Visit - Genetic Counseling Evaluation   Elvia Nassar  : 1990  MRN: [id number]  REFERRING PROVIDER: Dr. Danie Ocampo  PARTNER NAME: Dave    DATE OF SERVICE: 25  TIME SPENT: 40 min face to face; 60 min total    REASON FOR CONSULT:  Elvia Nassar, a 35 y.o. female with a finding of fetal ascites on ultrasound at 25wga was referred for genetic counseling to discuss the genetic underpinnings of this finding. She came to the appointment with her son.     OBSETRIC HISTORY   AGE AT MARQUIS: 35y  MARQUIS: 2025  GESTATION: [place]  GESTATIONAL AGE:28w2d    PREGNANCY HISTORY    OB History    Para Term  AB Living   2 1 1 0 0 1   SAB IAB Ectopic Multiple Live Births   0 0 0 0 1      # Outcome Date GA Lbr Keshav/2nd Weight Sex Type Anes PTL Lv   2 Current            1 Term 10/11/07 40w0d  2.25 kg (4 lb 15.4 oz) M CS-Unspec   ANGELA      Birth Comments: Pelvimetry performed in [facility], was told she cannot labor       MEDICAL HISTORY:  MEDICATIONS/EXPOSURES: Not discussed    FAMILY HISTORY:  Please see scanned pedigree in patient's chart under media. Patient's ancestry is [place]. FOB ancestry is [place] and [place]. Consanguinity was denied. Family history is non-contributory for this indication.     Additional history negative for multiple miscarriages/stillbirth, developmental delay/intellectual disability, and known genetic disorders. Complete pedigree will be linked to this encounter and can be viewed under the Media tab. The information provided is based on the patient and/or their reproductive partner's recollection of the family history and in the absence of complete medical records. If the family history changes or if more information is obtained, they were asked to contact us as this may alter the recommendations or impression of the family history.     PAST TESTING  Patient carrier screening: Low risk CF and electrophoresis  Reproductive partner carrier  screening: NA  Parental Karyotypes: NA    PREGNANCY TESTING  cfDNA for aneuploidy: Low risk  Diagnostic testing: NA  Fetal karyotype: NA    COUNSELING:   Fetal ascites may occur for various etiological reasons including genitourinary, gastrointestinal, viral/bacterial infection, cardiac anomalies such as a structural defect or arrhythmia. There can be an underlying genetic cause which may include chromosomal anomalies like Trisomy 21 or Trisomy 18. Single gene conditions or a contiguous gene deletion syndromes such as 4p deletion syndrome can also cause ascites. In addition ascites may be idiopathic.     Fetal ascites can progress to hydrops, may resolve or may remain stable. Idiopathic ascites is unlikely to progress to hydrops. Survival among fetuses with isolated fetal ascites was 74% for fetuses diagnosed at 24 weeks of gestation or later and 61% among fetuses diagnosed at less than 24 weeks. Fetal outcome appears to be related to the etiology of the isolated fetal ascites.     Screening tests are not definitive. If the test indicates an increased risk for a chromosome problem, it is recommended to confirm with a diagnostic test such as amniocentesis or CVS. Alternatively, a low risk or negative result on a screening test is reassuring, but it does not eliminate the possibility that the fetus is affected with the condition.     We then reviewed diagnostic testing options. Unlike screening tests, diagnostic tests provide definitive answers regarding the presence of fetal chromosome abnormalities. In addition to assessing for the presence of the common fetal aneuploidies, diagnostic tests can assess for abnormalities in the number and structure of all chromosomes. While both procedures are generally quite safe, there is a risk of miscarriage associated with these procedures.For genetic amniocentesis, the estimated risk of miscarriage attributable to the procedure is 1 in 900.    Esme Hastings MD; Tiffanie Armijo  POOJA WELCH; Obie Cartwright MD; Sincere Escalante MD, PhD; Sarah Chavez MD; Bernie Mathis MD; Olena Ennis MD; Chas Becerra MD; Leigh Stevens MD; Elia Brown MD Etiology and Outcome of Isolated Fetal Ascites, Obstetrics & Gynecology: December 2021 - Volume 138 - Issue 6 - p [id number] doi: [id number]    DISCUSSION & IMPRESSION:  Elvia is a 35 y.o. female with a finding of fetal ascites on ultrasound about 3 weeks ago. She has had low risk cfDNA screening, otherwise normal fetal anatomic ultrasound and normal maternal viral serologies, though notably CMV was not drawn. She wanted to review what can cause fetal ascites and the possible outcomes. We discussed the above information and she was understanding of this information and in light of the normal ultrasound and maternal screening/serologies could indicate idiopathic ascites. She did not have many additional questions and would consider amniocentesis if the ascites persists/worsens.     TESTING OPTIONS  Diagnostic Testing: Amniocentesis  Carrier Screening: Not discussed  Pregnancy Options: NA  Recurrence Risk: Unable to determine  Procedures/labs DECLINED today: Amniocentesis    Elvia stated that she was comfortable with the information discussed and feels calm.     We reviewed Elvia's medical and family history. We discussed basics of genetics and diagnostic testing vs screening. Elvia was understanding of the information discussed in clinic and all questions were answered.     Please see Dr. Ocampo's note for detailed information regarding ultrasound findings, plan of care and diagnostic considerations.    RECOMMENDATIONS:  Draw maternal CMV IgG/IgM as ordered    Naomi López MS, [facility]  Licensed, Certified Genetic Counselor  [facility]

## 2025-05-06 ENCOUNTER — TELEPHONE (OUTPATIENT)
Dept: OBSTETRICS AND GYNECOLOGY | Facility: CLINIC | Age: 35
End: 2025-05-06
Payer: MEDICAID

## 2025-05-08 ENCOUNTER — PATIENT MESSAGE (OUTPATIENT)
Dept: OBSTETRICS AND GYNECOLOGY | Facility: CLINIC | Age: 35
End: 2025-05-08

## 2025-05-08 ENCOUNTER — LAB VISIT (OUTPATIENT)
Dept: LAB | Facility: HOSPITAL | Age: 35
End: 2025-05-08
Attending: OBSTETRICS & GYNECOLOGY
Payer: MEDICAID

## 2025-05-08 ENCOUNTER — ROUTINE PRENATAL (OUTPATIENT)
Dept: OBSTETRICS AND GYNECOLOGY | Facility: CLINIC | Age: 35
End: 2025-05-08
Payer: MEDICAID

## 2025-05-08 VITALS
SYSTOLIC BLOOD PRESSURE: 103 MMHG | WEIGHT: 158.25 LBS | DIASTOLIC BLOOD PRESSURE: 69 MMHG | BODY MASS INDEX: 30.91 KG/M2

## 2025-05-08 DIAGNOSIS — Z3A.28 28 WEEKS GESTATION OF PREGNANCY: ICD-10-CM

## 2025-05-08 DIAGNOSIS — Z3A.28 28 WEEKS GESTATION OF PREGNANCY: Primary | ICD-10-CM

## 2025-05-08 LAB
ABSOLUTE EOSINOPHIL (OHS): 0.11 K/UL
ABSOLUTE MONOCYTE (OHS): 0.57 K/UL (ref 0.3–1)
ABSOLUTE NEUTROPHIL COUNT (OHS): 3.9 K/UL (ref 1.8–7.7)
BASOPHILS # BLD AUTO: 0.02 K/UL
BASOPHILS NFR BLD AUTO: 0.3 %
BILIRUB SERPL-MCNC: NORMAL MG/DL
BLOOD URINE, POC: NORMAL
CLARITY, POC UA: CLEAR
COLOR, POC UA: YELLOW
ERYTHROCYTE [DISTWIDTH] IN BLOOD BY AUTOMATED COUNT: 13 % (ref 11.5–14.5)
GLUCOSE UR QL STRIP: NORMAL
GROUP & RH: NORMAL
HCT VFR BLD AUTO: 35.4 % (ref 37–48.5)
HGB BLD-MCNC: 11.5 GM/DL (ref 12–16)
HIV 1+2 AB+HIV1 P24 AG SERPL QL IA: NORMAL
IMM GRANULOCYTES # BLD AUTO: 0.06 K/UL (ref 0–0.04)
IMM GRANULOCYTES NFR BLD AUTO: 1 % (ref 0–0.5)
INDIRECT COOMBS: NORMAL
IRON SATN MFR SERPL: 13 % (ref 20–50)
IRON SERPL-MCNC: 67 UG/DL (ref 30–160)
KETONES UR QL STRIP: NORMAL
LEUKOCYTE ESTERASE URINE, POC: NORMAL
LYMPHOCYTES # BLD AUTO: 1.48 K/UL (ref 1–4.8)
MCH RBC QN AUTO: 31.9 PG (ref 27–31)
MCHC RBC AUTO-ENTMCNC: 32.5 G/DL (ref 32–36)
MCV RBC AUTO: 98 FL (ref 82–98)
NITRITE, POC UA: NORMAL
NUCLEATED RBC (/100WBC) (OHS): 0 /100 WBC
PH, POC UA: 7.5
PLATELET # BLD AUTO: 244 K/UL (ref 150–450)
PMV BLD AUTO: 10.6 FL (ref 9.2–12.9)
PROTEIN, POC: NORMAL
RBC # BLD AUTO: 3.61 M/UL (ref 4–5.4)
RELATIVE EOSINOPHIL (OHS): 1.8 %
RELATIVE LYMPHOCYTE (OHS): 24.1 % (ref 18–48)
RELATIVE MONOCYTE (OHS): 9.3 % (ref 4–15)
RELATIVE NEUTROPHIL (OHS): 63.5 % (ref 38–73)
SPECIFIC GRAVITY, POC UA: 1.02
T PALLIDUM IGG+IGM SER QL: NORMAL
TIBC SERPL-MCNC: 519 UG/DL (ref 250–450)
TRANSFERRIN SERPL-MCNC: 351 MG/DL (ref 200–375)
UROBILINOGEN, POC UA: 0.2
WBC # BLD AUTO: 6.14 K/UL (ref 3.9–12.7)
WEAK D AG RBC QL: NORMAL

## 2025-05-08 PROCEDURE — 99212 OFFICE O/P EST SF 10 MIN: CPT | Mod: PBBFAC,PO | Performed by: OBSTETRICS & GYNECOLOGY

## 2025-05-08 PROCEDURE — 86593 SYPHILIS TEST NON-TREP QUANT: CPT

## 2025-05-08 PROCEDURE — 87389 HIV-1 AG W/HIV-1&-2 AB AG IA: CPT

## 2025-05-08 PROCEDURE — 83540 ASSAY OF IRON: CPT

## 2025-05-08 PROCEDURE — 36415 COLL VENOUS BLD VENIPUNCTURE: CPT

## 2025-05-08 PROCEDURE — 99999PBSHW POCT URINE DIPSTICK WITHOUT MICROSCOPE: Mod: PBBFAC,,,

## 2025-05-08 PROCEDURE — 99999 PR PBB SHADOW E&M-EST. PATIENT-LVL II: CPT | Mod: PBBFAC,,, | Performed by: OBSTETRICS & GYNECOLOGY

## 2025-05-08 PROCEDURE — 86850 RBC ANTIBODY SCREEN: CPT | Performed by: OBSTETRICS & GYNECOLOGY

## 2025-05-08 PROCEDURE — 85025 COMPLETE CBC W/AUTO DIFF WBC: CPT

## 2025-05-08 PROCEDURE — 81002 URINALYSIS NONAUTO W/O SCOPE: CPT | Mod: PBBFAC,PO | Performed by: OBSTETRICS & GYNECOLOGY

## 2025-05-08 NOTE — PROGRESS NOTES
Doing well, no issues     FHT normal    @ 24  wga   IOB labs done  Negative MT21, female  Anatomy US done today   Glucose negative   Wants tdap  Needs consents   History of , desires repeat   Wants to set up next visit   Rh negative, got rhogam in hospital   Unsure partners blood type; never did screen  Will do this week   H/o HSV: valtrex at 36 wga   Baby noted to have ascitics on Us but resolved and now bowel prominent, MFM feels ok to deliver here    Growth US: 25 2lb 8 oz 37%; vertex     Face to Face time with patient: 20 minutes of total time spent on the encounter, which includes face to face time and non-face to face time preparing to see the patient (eg, review of tests), Obtaining and/or reviewing separately obtained history, Documenting clinical information in the electronic or other health record, Independently interpreting results (not separately reported) and communicating results to the patient/family/caregiver, or Care coordination (not separately reported).

## 2025-05-09 RX ORDER — FERROUS SULFATE 325(65) MG
325 TABLET, DELAYED RELEASE (ENTERIC COATED) ORAL EVERY OTHER DAY
Qty: 60 TABLET | Refills: 3 | Status: SHIPPED | OUTPATIENT
Start: 2025-05-09

## 2025-05-13 ENCOUNTER — CLINICAL SUPPORT (OUTPATIENT)
Dept: OBSTETRICS AND GYNECOLOGY | Facility: CLINIC | Age: 35
End: 2025-05-13
Payer: MEDICAID

## 2025-05-13 DIAGNOSIS — Z29.13 NEED FOR RHOGAM DUE TO RH NEGATIVE MOTHER: ICD-10-CM

## 2025-05-13 DIAGNOSIS — Z23 NEED FOR TDAP VACCINATION: Primary | ICD-10-CM

## 2025-05-13 PROCEDURE — 90471 IMMUNIZATION ADMIN: CPT | Mod: PBBFAC,PO

## 2025-05-13 PROCEDURE — 99999PBSHW PR PBB SHADOW TECHNICAL ONLY FILED TO HB: Mod: PBBFAC,,,

## 2025-05-13 PROCEDURE — 99999 PR PBB SHADOW E&M-EST. PATIENT-LVL I: CPT | Mod: PBBFAC,,,

## 2025-05-13 PROCEDURE — 90715 TDAP VACCINE 7 YRS/> IM: CPT | Mod: PBBFAC,PO

## 2025-05-13 RX ADMIN — CLOSTRIDIUM TETANI TOXOID ANTIGEN (FORMALDEHYDE INACTIVATED), CORYNEBACTERIUM DIPHTHERIAE TOXOID ANTIGEN (FORMALDEHYDE INACTIVATED), BORDETELLA PERTUSSIS TOXOID ANTIGEN (GLUTARALDEHYDE INACTIVATED), BORDETELLA PERTUSSIS FILAMENTOUS HEMAGGLUTININ ANTIGEN (FORMALDEHYDE INACTIVATED), BORDETELLA PERTUSSIS PERTACTIN ANTIGEN, AND BORDETELLA PERTUSSIS FIMBRIAE 2/3 ANTIGEN 0.5 ML: 5; 2; 2.5; 5; 3; 5 INJECTION, SUSPENSION INTRAMUSCULAR at 11:05

## 2025-05-13 RX ADMIN — RHO(D) IMMUNE GLOBULIN (HUMAN) 300 MCG: 1500 SOLUTION INTRAMUSCULAR at 11:05

## 2025-05-13 NOTE — PROGRESS NOTES
Tdap Administered IM in Left deltoid and Rhogam administered IM to RUOGQ. No redness or swelling noted. Patient advised to stay in clinic for 15-20 to be observed for any reaction. Patient verbalizes understanding.

## 2025-05-17 ENCOUNTER — PATIENT MESSAGE (OUTPATIENT)
Dept: OTHER | Facility: OTHER | Age: 35
End: 2025-05-17
Payer: MEDICAID

## 2025-05-22 ENCOUNTER — ROUTINE PRENATAL (OUTPATIENT)
Dept: OBSTETRICS AND GYNECOLOGY | Facility: CLINIC | Age: 35
End: 2025-05-22
Payer: MEDICAID

## 2025-05-22 VITALS
BODY MASS INDEX: 32.28 KG/M2 | DIASTOLIC BLOOD PRESSURE: 70 MMHG | HEART RATE: 98 BPM | SYSTOLIC BLOOD PRESSURE: 103 MMHG | WEIGHT: 165.31 LBS

## 2025-05-22 DIAGNOSIS — Z3A.30 30 WEEKS GESTATION OF PREGNANCY: Primary | ICD-10-CM

## 2025-05-22 DIAGNOSIS — Z98.891 HISTORY OF C-SECTION: ICD-10-CM

## 2025-05-22 DIAGNOSIS — E61.1 IRON DEFICIENCY: Primary | ICD-10-CM

## 2025-05-22 LAB
BILIRUB SERPL-MCNC: ABNORMAL MG/DL
BLOOD URINE, POC: ABNORMAL
CLARITY, POC UA: CLEAR
COLOR, POC UA: YELLOW
GLUCOSE UR QL STRIP: ABNORMAL
KETONES UR QL STRIP: ABNORMAL
LEUKOCYTE ESTERASE URINE, POC: ABNORMAL
NITRITE, POC UA: ABNORMAL
PH, POC UA: 6
PROTEIN, POC: ABNORMAL
SPECIFIC GRAVITY, POC UA: 1.02
UROBILINOGEN, POC UA: 0.2

## 2025-05-22 PROCEDURE — 99999PBSHW POCT URINE DIPSTICK WITHOUT MICROSCOPE: Mod: PBBFAC,,,

## 2025-05-22 PROCEDURE — 99214 OFFICE O/P EST MOD 30 MIN: CPT | Mod: TH,S$PBB,, | Performed by: OBSTETRICS & GYNECOLOGY

## 2025-05-22 PROCEDURE — 81002 URINALYSIS NONAUTO W/O SCOPE: CPT | Mod: PBBFAC,PO | Performed by: OBSTETRICS & GYNECOLOGY

## 2025-05-22 PROCEDURE — 99213 OFFICE O/P EST LOW 20 MIN: CPT | Mod: PBBFAC,PO | Performed by: OBSTETRICS & GYNECOLOGY

## 2025-05-22 PROCEDURE — 99999 PR PBB SHADOW E&M-EST. PATIENT-LVL III: CPT | Mod: PBBFAC,,, | Performed by: OBSTETRICS & GYNECOLOGY

## 2025-05-22 RX ORDER — VALACYCLOVIR HYDROCHLORIDE 500 MG/1
500 TABLET, FILM COATED ORAL 2 TIMES DAILY
Qty: 60 TABLET | Refills: 11 | Status: SHIPPED | OUTPATIENT
Start: 2025-05-22 | End: 2026-05-22

## 2025-05-22 RX ORDER — EPINEPHRINE 0.3 MG/.3ML
0.3 INJECTION SUBCUTANEOUS ONCE AS NEEDED
OUTPATIENT
Start: 2025-05-22

## 2025-05-22 RX ORDER — HEPARIN 100 UNIT/ML
500 SYRINGE INTRAVENOUS
OUTPATIENT
Start: 2025-05-22

## 2025-05-22 RX ORDER — SODIUM CHLORIDE 0.9 % (FLUSH) 0.9 %
10 SYRINGE (ML) INJECTION
OUTPATIENT
Start: 2025-05-22

## 2025-05-22 NOTE — PROGRESS NOTES
Doing well, no issues     FHT normal    @ 24  wga   IOB labs done  Negative MT21, female  Anatomy US done today   Glucose negative   did tdap  Did consents   History of , desires repeat   Set up  at 10 30   Rh negative, got rhogam in hospital   Unsure partners blood type; never did screen  Rhogam done 25  H/o HSV: valtrex at 36 wga, sent in    Baby noted to have ascitics on Us but resolved and now bowel prominent, MFM feels ok to deliver here  Low iron levels, mild anemia, can't tolerate iron pills, upsets her stomach, will try infusions but if not approved then ok to eat iron rich foods.      Growth US: 25 2lb 8 oz 37%; vertex     -pills for birth control (did implant but gave her headaches)     Face to Face time with patient: 30 minutes of total time spent on the encounter, which includes face to face time and non-face to face time preparing to see the patient (eg, review of tests), Obtaining and/or reviewing separately obtained history, Documenting clinical information in the electronic or other health record, Independently interpreting results (not separately reported) and communicating results to the patient/family/caregiver, or Care coordination (not separately reported).

## 2025-05-28 ENCOUNTER — TELEPHONE (OUTPATIENT)
Dept: INFUSION THERAPY | Facility: HOSPITAL | Age: 35
End: 2025-05-28
Payer: MEDICAID

## 2025-05-28 NOTE — TELEPHONE ENCOUNTER
Using language line Gabby [phone] (1st attempt) left message on voicemail advising the patient to call infusion center back to schedule appt

## 2025-05-29 ENCOUNTER — PATIENT MESSAGE (OUTPATIENT)
Dept: OBSTETRICS AND GYNECOLOGY | Facility: CLINIC | Age: 35
End: 2025-05-29

## 2025-05-29 ENCOUNTER — HOSPITAL ENCOUNTER (OUTPATIENT)
Facility: HOSPITAL | Age: 35
Discharge: HOME | End: 2025-05-29
Attending: OBSTETRICS & GYNECOLOGY | Admitting: OBSTETRICS & GYNECOLOGY
Payer: MEDICAID

## 2025-05-29 VITALS
SYSTOLIC BLOOD PRESSURE: 114 MMHG | OXYGEN SATURATION: 96 % | TEMPERATURE: 99 F | HEART RATE: 86 BPM | DIASTOLIC BLOOD PRESSURE: 68 MMHG

## 2025-05-29 DIAGNOSIS — Z3A.31 31 WEEKS GESTATION OF PREGNANCY: ICD-10-CM

## 2025-05-29 PROBLEM — O36.8130 DECREASED FETAL MOVEMENTS IN THIRD TRIMESTER: Status: ACTIVE | Noted: 2025-05-29

## 2025-05-29 PROCEDURE — 59025 FETAL NON-STRESS TEST: CPT

## 2025-05-29 PROCEDURE — 99211 OFF/OP EST MAY X REQ PHY/QHP: CPT

## 2025-05-29 PROCEDURE — 99214 OFFICE O/P EST MOD 30 MIN: CPT | Mod: TH,,, | Performed by: OBSTETRICS & GYNECOLOGY

## 2025-05-29 RX ORDER — ONDANSETRON 8 MG/1
8 TABLET, ORALLY DISINTEGRATING ORAL EVERY 8 HOURS PRN
Status: DISCONTINUED | OUTPATIENT
Start: 2025-05-29 | End: 2025-05-29 | Stop reason: HOSPADM

## 2025-05-29 RX ORDER — ACETAMINOPHEN 500 MG
500 TABLET ORAL EVERY 6 HOURS PRN
Status: DISCONTINUED | OUTPATIENT
Start: 2025-05-29 | End: 2025-05-29 | Stop reason: HOSPADM

## 2025-05-29 NOTE — NURSING
Notified Dr. House of pt's arrival and c/o of decreased fetal movement.  Reactive NST, orders reviewed BPP with MEL.  Explained to pt and son what test we were going to do.  Pt verbalized understanding.

## 2025-05-29 NOTE — NURSING
Pt arrived on unit with decreased fetal movement today.   Pt is a @31w5d IUP.  Pt denied any medical problems or complaints.  Placed on monitor.

## 2025-05-30 ENCOUNTER — TELEPHONE (OUTPATIENT)
Dept: INFUSION THERAPY | Facility: HOSPITAL | Age: 35
End: 2025-05-30
Payer: MEDICAID

## 2025-05-30 ENCOUNTER — TELEPHONE (OUTPATIENT)
Dept: OBSTETRICS AND GYNECOLOGY | Facility: CLINIC | Age: 35
End: 2025-05-30
Payer: MEDICAID

## 2025-05-30 NOTE — TELEPHONE ENCOUNTER
Called patient with a .  Patient had concerns that the baby was not moving as much as it usually move.   Patient stated she went to the ER and they stated that the baby is moving fine.    I informed patient to try drinking cold water and sugary drinks and do kick counts I also informed her that if there is still less movement to visit the ER so that they  can do further testing.     ----- Message from [employer] sent at 5/29/2025  3:53 PM CDT -----  Type:  Needs Medical AdviceWho Called: ptWould the patient rather a call back or a response via [facility]? Call [facility] Call Back Number: [phone] Information: Pt stated she would like a call back with an  because her baby not moving much its very little and she is worried

## 2025-05-30 NOTE — PROGRESS NOTES
[facility] - Labor & Delivery  History & Physical  Obstetrics   Labor and Delivery Triage    SUBJECTIVE:     Patient Info:  Elvia Nassar         35 y.o.    female    1990     Chief Complaint/Reason for Admission: Decreased fetal movement    History of Present Illness:  Patient presents at 31 and 5/7 weeks gestation with EDC 2025 who presents with a complaint of decreased fetal movement. She denies contractions, no loss of fluid, positive fetal movement, no vaginal bleeding.     This pregnancy has been complicated by Rh negative status, h/o .     OB History:   OB History          2    Para   1    Term   1            AB        Living   1         SAB        IAB        Ectopic        Multiple        Live Births   1                   Estimated Date of Delivery: 25     Gestational Age:  31w5d    Past Medical History:  History reviewed. No pertinent past medical history.     Past Surgical History:  Past Surgical History:   Procedure Laterality Date     SECTION      Reports she could not labor dur to pelvimetry in [facility]       Social History:   Alcohol/Tobacco:  Social History     Tobacco Use    Smoking status: Never    Smokeless tobacco: Never   Substance Use Topics    Alcohol use: Not Currently      Drug Use:  Social History     Substance and Sexual Activity   Drug Use Never       Family History:      Allergies:  Review of patient's allergies indicates:  No Known Allergies    Meds Prior to Arrival:  Prior to Admission medications    Medication Sig Start Date End Date Taking? Authorizing Provider   ferrous sulfate 325 (65 FE) MG EC tablet Take 1 tablet (325 mg total) by mouth every other day. 25   Kelli Timmons MD   PNV,calcium 72/iron/folic acid (PRENATAL VITAMIN) Tab Take 1 tablet by mouth once daily. 3/14/25 3/14/26  Kelli Timmons MD   valACYclovir (VALTREX) 500 MG tablet Take 1 tablet (500 mg total) by mouth 2 (two) times daily. 25  Kelli Timmons  MD        Review of Systems:  Constitutional: no fever or chills  ENT: no nasal congestion or sore throat  Respiratory: no cough or shortness of breath  Cardiovascular: no chest pain or palpitations  Gastrointestinal: no nausea or vomiting, tolerating diet  Hematologic/Lymphatic: no easy bruising or lymphadenopathy  Musculoskeletal: no arthralgias or myalgias  Behavioral/Psych: no auditory or visual hallucinations    OBJECTIVE:     Recent Vitals:  /68   Pulse 86   Temp 99.1 °F (37.3 °C)   LMP 10/18/2024     Exam:    Deferred    General: well developed, well nourished  Lungs:  normal respiratory effort  Heart: regular rate and rhythm  Abdomen: soft, non-tender non-distented; bowel sounds normal; no masses,  no organomegaly  Pelvic:  Exam deferred.  Extremities: no cyanosis or edema, or clubbing    Lab Results:  No results found for this or any previous visit (from the past 36 hours).  Lab Results   Component Value Date    GROUPTRH O NEG 05/08/2025          Diagnostic Studies:    Baseline: 130 bpm Cat 1, reassuring  TOCO: 0 contractions    BPP: 8/8      ASSESSMENT/PLAN:     Dx:31 weeks gestation of pregnancy [Z3A.31]  Active Hospital Problems    Diagnosis  POA    *Decreased fetal movements in third trimester [O36.8130]  Yes      Resolved Hospital Problems   No resolved problems to display.       Discussed ultrasound findings with patient and possible causes for decreased movement. Precautions given - bleeding, loss of fluid, decreased fetal movement, contractions. Patient voiced she has appointment with Dr. Ocampo on 6/2 and Dr. Timmons on 6/5.

## 2025-05-30 NOTE — TELEPHONE ENCOUNTER
Using language line. Ramiro [phone] Confirmed upcoming infusion appointments with the patient. Reviewed pre infusion instructions with the patient.   6/10 at 12p

## 2025-05-31 ENCOUNTER — PATIENT MESSAGE (OUTPATIENT)
Dept: OTHER | Facility: OTHER | Age: 35
End: 2025-05-31
Payer: MEDICAID

## 2025-06-02 ENCOUNTER — PROCEDURE VISIT (OUTPATIENT)
Dept: MATERNAL FETAL MEDICINE | Facility: CLINIC | Age: 35
End: 2025-06-02
Payer: MEDICAID

## 2025-06-02 ENCOUNTER — OFFICE VISIT (OUTPATIENT)
Dept: MATERNAL FETAL MEDICINE | Facility: CLINIC | Age: 35
End: 2025-06-02
Payer: MEDICAID

## 2025-06-02 VITALS — DIASTOLIC BLOOD PRESSURE: 80 MMHG | SYSTOLIC BLOOD PRESSURE: 125 MMHG

## 2025-06-02 DIAGNOSIS — Z36.89 ENCOUNTER FOR ULTRASOUND TO ASSESS FETAL GROWTH: Primary | ICD-10-CM

## 2025-06-02 DIAGNOSIS — O33.7XX0 FETAL ASCITES CAUSING DISPROPORTION: ICD-10-CM

## 2025-06-02 DIAGNOSIS — Z36.89 ENCOUNTER FOR ULTRASOUND TO ASSESS FETAL GROWTH: ICD-10-CM

## 2025-06-02 DIAGNOSIS — O28.3 ABNORMAL PRENATAL ULTRASOUND: ICD-10-CM

## 2025-06-02 PROCEDURE — 76816 OB US FOLLOW-UP PER FETUS: CPT | Mod: PBBFAC | Performed by: OBSTETRICS & GYNECOLOGY

## 2025-06-02 PROCEDURE — 76819 FETAL BIOPHYS PROFIL W/O NST: CPT | Mod: 26,S$PBB,, | Performed by: OBSTETRICS & GYNECOLOGY

## 2025-06-02 PROCEDURE — 1159F MED LIST DOCD IN RCRD: CPT | Mod: CPTII,,, | Performed by: OBSTETRICS & GYNECOLOGY

## 2025-06-02 PROCEDURE — 76819 FETAL BIOPHYS PROFIL W/O NST: CPT | Mod: PBBFAC | Performed by: OBSTETRICS & GYNECOLOGY

## 2025-06-02 PROCEDURE — 99999 PR PBB SHADOW E&M-EST. PATIENT-LVL II: CPT | Mod: PBBFAC,,, | Performed by: OBSTETRICS & GYNECOLOGY

## 2025-06-02 PROCEDURE — 3074F SYST BP LT 130 MM HG: CPT | Mod: CPTII,,, | Performed by: OBSTETRICS & GYNECOLOGY

## 2025-06-02 PROCEDURE — 76816 OB US FOLLOW-UP PER FETUS: CPT | Mod: 26,S$PBB,, | Performed by: OBSTETRICS & GYNECOLOGY

## 2025-06-02 PROCEDURE — 99212 OFFICE O/P EST SF 10 MIN: CPT | Mod: PBBFAC,TH | Performed by: OBSTETRICS & GYNECOLOGY

## 2025-06-02 PROCEDURE — 3079F DIAST BP 80-89 MM HG: CPT | Mod: CPTII,,, | Performed by: OBSTETRICS & GYNECOLOGY

## 2025-06-02 PROCEDURE — 99213 OFFICE O/P EST LOW 20 MIN: CPT | Mod: S$PBB,TH,, | Performed by: OBSTETRICS & GYNECOLOGY

## 2025-06-03 RX ORDER — FERROUS SULFATE 325(65) MG
325 TABLET, DELAYED RELEASE (ENTERIC COATED) ORAL EVERY OTHER DAY
Qty: 60 TABLET | Refills: 3 | Status: SHIPPED | OUTPATIENT
Start: 2025-06-03

## 2025-06-03 RX ORDER — PRENATAL WITH FERROUS FUM AND FOLIC ACID 3080; 920; 120; 400; 22; 1.84; 3; 20; 10; 1; 12; 200; 27; 25; 2 [IU]/1; [IU]/1; MG/1; [IU]/1; MG/1; MG/1; MG/1; MG/1; MG/1; MG/1; UG/1; MG/1; MG/1; MG/1; MG/1
1 TABLET ORAL DAILY
Qty: 90 TABLET | Refills: 3 | Status: SHIPPED | OUTPATIENT
Start: 2025-06-03 | End: 2026-06-03

## 2025-06-05 ENCOUNTER — ROUTINE PRENATAL (OUTPATIENT)
Dept: OBSTETRICS AND GYNECOLOGY | Facility: CLINIC | Age: 35
End: 2025-06-05
Payer: MEDICAID

## 2025-06-05 VITALS
SYSTOLIC BLOOD PRESSURE: 109 MMHG | BODY MASS INDEX: 32.96 KG/M2 | HEART RATE: 88 BPM | WEIGHT: 168.75 LBS | DIASTOLIC BLOOD PRESSURE: 71 MMHG

## 2025-06-05 DIAGNOSIS — Z3A.32 32 WEEKS GESTATION OF PREGNANCY: Primary | ICD-10-CM

## 2025-06-05 LAB
BILIRUB SERPL-MCNC: ABNORMAL MG/DL
BLOOD URINE, POC: ABNORMAL
CLARITY, POC UA: ABNORMAL
COLOR, POC UA: YELLOW
GLUCOSE UR QL STRIP: ABNORMAL
KETONES UR QL STRIP: ABNORMAL
LEUKOCYTE ESTERASE URINE, POC: ABNORMAL
NITRITE, POC UA: ABNORMAL
PH, POC UA: 6.5
PROTEIN, POC: ABNORMAL
SPECIFIC GRAVITY, POC UA: 1.01
UROBILINOGEN, POC UA: 0.2

## 2025-06-05 PROCEDURE — 81002 URINALYSIS NONAUTO W/O SCOPE: CPT | Mod: PBBFAC,PO | Performed by: OBSTETRICS & GYNECOLOGY

## 2025-06-05 PROCEDURE — 99999 PR PBB SHADOW E&M-EST. PATIENT-LVL II: CPT | Mod: PBBFAC,,, | Performed by: OBSTETRICS & GYNECOLOGY

## 2025-06-05 PROCEDURE — 99212 OFFICE O/P EST SF 10 MIN: CPT | Mod: PBBFAC,PO | Performed by: OBSTETRICS & GYNECOLOGY

## 2025-06-05 PROCEDURE — 99999PBSHW POCT URINE DIPSTICK WITHOUT MICROSCOPE: Mod: PBBFAC,,,

## 2025-06-10 ENCOUNTER — INFUSION (OUTPATIENT)
Dept: INFUSION THERAPY | Facility: HOSPITAL | Age: 35
End: 2025-06-10
Attending: OBSTETRICS & GYNECOLOGY
Payer: MEDICAID

## 2025-06-10 VITALS
RESPIRATION RATE: 18 BRPM | HEART RATE: 91 BPM | TEMPERATURE: 98 F | DIASTOLIC BLOOD PRESSURE: 61 MMHG | OXYGEN SATURATION: 98 % | SYSTOLIC BLOOD PRESSURE: 122 MMHG

## 2025-06-10 DIAGNOSIS — E61.1 IRON DEFICIENCY: Primary | ICD-10-CM

## 2025-06-10 PROCEDURE — 25000003 PHARM REV CODE 250: Performed by: OBSTETRICS & GYNECOLOGY

## 2025-06-10 PROCEDURE — A4216 STERILE WATER/SALINE, 10 ML: HCPCS | Performed by: OBSTETRICS & GYNECOLOGY

## 2025-06-10 PROCEDURE — 96365 THER/PROPH/DIAG IV INF INIT: CPT

## 2025-06-10 PROCEDURE — 63600175 PHARM REV CODE 636 W HCPCS: Mod: JZ,TB | Performed by: OBSTETRICS & GYNECOLOGY

## 2025-06-10 RX ORDER — HEPARIN 100 UNIT/ML
500 SYRINGE INTRAVENOUS
OUTPATIENT
Start: 2025-06-10

## 2025-06-10 RX ORDER — SODIUM CHLORIDE 0.9 % (FLUSH) 0.9 %
10 SYRINGE (ML) INJECTION
Status: DISCONTINUED | OUTPATIENT
Start: 2025-06-10 | End: 2025-06-10 | Stop reason: HOSPADM

## 2025-06-10 RX ORDER — SODIUM CHLORIDE 0.9 % (FLUSH) 0.9 %
10 SYRINGE (ML) INJECTION
OUTPATIENT
Start: 2025-06-10

## 2025-06-10 RX ORDER — EPINEPHRINE 0.3 MG/.3ML
0.3 INJECTION SUBCUTANEOUS ONCE AS NEEDED
OUTPATIENT
Start: 2025-06-10

## 2025-06-10 RX ADMIN — FERUMOXYTOL 1020 MG: 510 INJECTION INTRAVENOUS at 12:06

## 2025-06-10 RX ADMIN — Medication 10 ML: at 01:06

## 2025-06-10 RX ADMIN — SODIUM CHLORIDE: 9 INJECTION, SOLUTION INTRAVENOUS at 12:06

## 2025-06-10 NOTE — NURSING
Patient tolerated Feraheme well today. Observed for 30 min post with no signs/symptoms of reaction noted. PIV removed, catheter tip intact. AVS given. Discharged home, ambulated independently.

## 2025-06-19 ENCOUNTER — ROUTINE PRENATAL (OUTPATIENT)
Dept: OBSTETRICS AND GYNECOLOGY | Facility: CLINIC | Age: 35
End: 2025-06-19
Payer: MEDICAID

## 2025-06-19 VITALS
HEART RATE: 84 BPM | WEIGHT: 172.81 LBS | BODY MASS INDEX: 33.76 KG/M2 | DIASTOLIC BLOOD PRESSURE: 74 MMHG | SYSTOLIC BLOOD PRESSURE: 113 MMHG

## 2025-06-19 DIAGNOSIS — Z3A.34 34 WEEKS GESTATION OF PREGNANCY: Primary | ICD-10-CM

## 2025-06-19 LAB
BILIRUB SERPL-MCNC: ABNORMAL MG/DL
BLOOD URINE, POC: ABNORMAL
CLARITY, POC UA: CLEAR
COLOR, POC UA: ABNORMAL
GLUCOSE UR QL STRIP: ABNORMAL
KETONES UR QL STRIP: ABNORMAL
LEUKOCYTE ESTERASE URINE, POC: ABNORMAL
NITRITE, POC UA: ABNORMAL
PH, POC UA: 6
PROTEIN, POC: ABNORMAL
SPECIFIC GRAVITY, POC UA: 1.01
UROBILINOGEN, POC UA: 0.2

## 2025-06-19 PROCEDURE — 81002 URINALYSIS NONAUTO W/O SCOPE: CPT | Mod: PBBFAC,PO | Performed by: OBSTETRICS & GYNECOLOGY

## 2025-06-19 PROCEDURE — 99999 PR PBB SHADOW E&M-EST. PATIENT-LVL II: CPT | Mod: PBBFAC,,, | Performed by: OBSTETRICS & GYNECOLOGY

## 2025-06-19 PROCEDURE — 99212 OFFICE O/P EST SF 10 MIN: CPT | Mod: PBBFAC,PO | Performed by: OBSTETRICS & GYNECOLOGY

## 2025-06-19 PROCEDURE — 99999PBSHW POCT URINE DIPSTICK WITHOUT MICROSCOPE: Mod: PBBFAC,,,

## 2025-06-19 NOTE — PROGRESS NOTES
Doing well, no issues     FHT normal    @ 34  wga   IOB labs done  Negative MT21, female Kemi!   Anatomy US done  Glucose negative   did tdap  Did consents   Did 3rd trim labs   History of , desires repeat   Set up  at 10 30   Rh negative, got rhogam in hospital   Unsure partners blood type; never did screen  Rhogam done 25  H/o HSV: valtrex at 36 wga, sent in    Baby noted to have ascitics on Us but resolved and now colonic dilation  Recommend delivery at [facility], [facility] will set up    Low iron levels, mild anemia, IV iron set up this week     Growth US: 25 2lb 8 oz 37%; vertex     -pills for birth control (did implant but gave her headaches)   -peds: unsure, discussed choices   -breast peds, requested pump through [employer]     Face to Face time with patient: 30 minutes of total time spent on the encounter, which includes face to face time and non-face to face time preparing to see the patient (eg, review of tests), Obtaining and/or reviewing separately obtained history, Documenting clinical information in the electronic or other health record, Independently interpreting results (not separately reported) and communicating results to the patient/family/caregiver, or Care coordination (not separately reported).

## 2025-06-21 ENCOUNTER — PATIENT MESSAGE (OUTPATIENT)
Dept: OTHER | Facility: OTHER | Age: 35
End: 2025-06-21
Payer: MEDICAID

## 2025-06-24 ENCOUNTER — OFFICE VISIT (OUTPATIENT)
Dept: MATERNAL FETAL MEDICINE | Facility: CLINIC | Age: 35
End: 2025-06-24
Payer: MEDICAID

## 2025-06-24 ENCOUNTER — PROCEDURE VISIT (OUTPATIENT)
Dept: MATERNAL FETAL MEDICINE | Facility: CLINIC | Age: 35
End: 2025-06-24
Payer: MEDICAID

## 2025-06-24 ENCOUNTER — TELEPHONE (OUTPATIENT)
Dept: OBSTETRICS AND GYNECOLOGY | Facility: CLINIC | Age: 35
End: 2025-06-24
Payer: MEDICAID

## 2025-06-24 VITALS — WEIGHT: 167 LBS | DIASTOLIC BLOOD PRESSURE: 72 MMHG | BODY MASS INDEX: 32.61 KG/M2 | SYSTOLIC BLOOD PRESSURE: 114 MMHG

## 2025-06-24 DIAGNOSIS — Z98.891 PREVIOUS CESAREAN SECTION: ICD-10-CM

## 2025-06-24 DIAGNOSIS — O28.3 ABNORMAL PRENATAL ULTRASOUND: Primary | ICD-10-CM

## 2025-06-24 DIAGNOSIS — Z36.89 ENCOUNTER FOR ULTRASOUND TO ASSESS FETAL GROWTH: ICD-10-CM

## 2025-06-24 PROCEDURE — 3078F DIAST BP <80 MM HG: CPT | Mod: CPTII,,, | Performed by: OBSTETRICS & GYNECOLOGY

## 2025-06-24 PROCEDURE — 99999 PR PBB SHADOW E&M-EST. PATIENT-LVL III: CPT | Mod: PBBFAC,,, | Performed by: OBSTETRICS & GYNECOLOGY

## 2025-06-24 PROCEDURE — 1159F MED LIST DOCD IN RCRD: CPT | Mod: CPTII,,, | Performed by: OBSTETRICS & GYNECOLOGY

## 2025-06-24 PROCEDURE — 99213 OFFICE O/P EST LOW 20 MIN: CPT | Mod: PBBFAC,TH,25 | Performed by: OBSTETRICS & GYNECOLOGY

## 2025-06-24 PROCEDURE — 3074F SYST BP LT 130 MM HG: CPT | Mod: CPTII,,, | Performed by: OBSTETRICS & GYNECOLOGY

## 2025-06-24 PROCEDURE — 3008F BODY MASS INDEX DOCD: CPT | Mod: CPTII,,, | Performed by: OBSTETRICS & GYNECOLOGY

## 2025-06-24 PROCEDURE — 76819 FETAL BIOPHYS PROFIL W/O NST: CPT | Mod: 26,S$PBB,, | Performed by: OBSTETRICS & GYNECOLOGY

## 2025-06-24 PROCEDURE — 76816 OB US FOLLOW-UP PER FETUS: CPT | Mod: PBBFAC | Performed by: OBSTETRICS & GYNECOLOGY

## 2025-06-24 PROCEDURE — 76819 FETAL BIOPHYS PROFIL W/O NST: CPT | Mod: PBBFAC | Performed by: OBSTETRICS & GYNECOLOGY

## 2025-06-24 PROCEDURE — 99214 OFFICE O/P EST MOD 30 MIN: CPT | Mod: S$PBB,TH,, | Performed by: OBSTETRICS & GYNECOLOGY

## 2025-06-24 PROCEDURE — 76816 OB US FOLLOW-UP PER FETUS: CPT | Mod: 26,S$PBB,, | Performed by: OBSTETRICS & GYNECOLOGY

## 2025-06-24 NOTE — PROGRESS NOTES
Maternal Fetal Medicine follow up consult    Patient is accompanied by her son     SUBJECTIVE:     Elvia Nassar is a 35 y.o.  female with IUP at 35w3d who is seen in follow up consultation by MFM.  Pregnancy complications include:   No problems updated.  Previous notes reviewed.   No changes to medical, surgical, family, social, or obstetric history.    Interval history since last MFM visit:   Patient has no complaints today. She is overall feeling well.  Patient reports rare contractions/cramping. Denies vaginal bleeding or leakage of fluid.  She reports good fetal movement.    Medications:  Current Outpatient Medications   Medication Instructions    ferrous sulfate 325 mg, Oral, Every other day    PNV,calcium 72/iron/folic acid (PRENATAL VITAMIN) Tab 1 tablet, Oral, Daily    valACYclovir (VALTREX) 500 mg, Oral, 2 times daily     Care team members:  Shanelle - Primary OB     OBJECTIVE:   /72 (BP Location: Left arm, Patient Position: Sitting)   Wt 75.8 kg (167 lb)   LMP 10/18/2024   BMI 32.61 kg/m²     Physical Exam:  Deferred    Ultrasound performed. See viewpoint for full ultrasound report.  Schwarz live IUP  Fetal size is appropriate for gestational age, with the EFW (2616 g) plotting at the 29% and the AC plotting at the 70%.   We once again see bowel dilation, likely colonic, measuring 28mm at largest diameter. There is a point, likely distal where we see a transition to normally measuring bowel. This is located likely in the descending colon, adjacent to the bladder, likely close to the rectum. Meconium is noted within the colon. Bowel wall appears somewhat thickened and echogenic. We once again seen an anal dimple.  Otherwise, a limited repeat fetal anatomic survey appears normal.   The BPP score is normal at 8/8.  The MVP is normal.  cephalic presentation.     Significant labs/imaging:  Lab Results   Component Value Date    [id number] Negative 2025    T18 Negative 2025     EJQ90I18 Negative 2025    MONOSOMYXRES Not Detected 2025     Cystic Fibrosis Mutation PNL See Below   Comment: Result: Normal Genotype    Interpretation:  This individual is negative for the 39 Cystic Fibrosis (CF)  mutations included in this assay, indicating a reduced risk  for CF.      Toxo IgG+, IgM neg  Parvo neg  CMV not collected.    ASSESSMENT/PLAN:     35 y.o.  female with IUP at 35w3d    Assessment & Plan  Abnormal prenatal ultrasound  Please see original consult for full counseling and recommendations   We discussed today's ultrasound that continues to demonstrate resolution of previously seen ascites.   However, we did discuss the progressive large bowel dilation that is seen today (see above).  Today at greatest dimension it appears to measure 28 mm.  Given prior findings of ascites with progressive dilation, the concern for pathologic causes remains present. Exact etiology is unknown and differential includes Hirschprung, CF, stricture, adhesions, colonic atresia (although less likely), etc.  We again reviewed this finding and the likely need for  evaluation to determine best treatment plan. The possibility of  surgery was discussed. The possibility of bowel rupture was also discussed in the setting of dilation.   Fetal ECHO previously found to be WNL. She had previously declined amniocentesis.  We did discuss the recommendation for delivery at [facility] given prenatal findings and availability of  subspecialty services here. Patient amenable.  Given history of , she would like to delivery via . This will be scheduled for 38-39 weeks.  Desires Depo vs POP.  We also discussed recommendation for weekly PNT.    Recommendations:  Weekly PNT to be scheduled by Dr. Timmons. Office made aware.  Repeat  with MFM to be scheduled.  No further ultrasounds prior to delivery  Added to high risk fetal list for further discussion.      Previous   section  Will plan for repeat for delivery.       FETAL CHECKLIST  Primary MFM: Danie Ocampo  Primary OB: Shanelle  Genetic Counseling: done by Genetic Counselor and MFM  Genetic testing: see above  Pediatric Cardiology Consult: Yes - WNL  Subspecialists: N/A - Will defer Peds surgery  Delivery timin-39 weeks  Delivery location: [facility]   LDR vs OR delivery: OR  Attendance at delivery: NICU  Mode of delivery:  delivery      Patient was counseled that prenatal ultrasound studies have limitations. They do not detect all fetal, genetic, placental, and maternal abnormalities.     FOLLOW UP:   No further ultrasounds or visits were scheduled.    The patient was given an opportunity to ask questions about the management of her high risk pregnancy problems. She expressed an understanding of and agreement to the above impression and plan. All questions were answered to her satisfaction.    30 minutes of total time spent on the encounter, which includes face to face time and non-face to face time preparing to see the patient (eg, review of tests), obtaining and/or reviewing separately obtained history, documenting clinical information in the electronic or other health record, independently interpreting results (not separately reported) and communicating results to the patient/family/caregiver, or care coordination (not separately reported).        Danie Ocampo MD   Maternal-Fetal Medicine      Electronically Signed by Danie Ocampo 2025

## 2025-06-24 NOTE — ASSESSMENT & PLAN NOTE
Please see original consult for full counseling and recommendations   We discussed today's ultrasound that continues to demonstrate resolution of previously seen ascites.   However, we did discuss the progressive large bowel dilation that is seen today (see above).  Today at greatest dimension it appears to measure 28 mm.  Given prior findings of ascites with progressive dilation, the concern for pathologic causes remains present. Exact etiology is unknown and differential includes Hirschprung, CF, stricture, adhesions, colonic atresia (although less likely), etc.  We again reviewed this finding and the likely need for  evaluation to determine best treatment plan. The possibility of  surgery was discussed. The possibility of bowel rupture was also discussed in the setting of dilation.   Fetal ECHO previously found to be WNL. She had previously declined amniocentesis.  We did discuss the recommendation for delivery at [facility] given prenatal findings and availability of  subspecialty services here. Patient amenable.  Given history of , she would like to delivery via . This will be scheduled for 38-39 weeks.  Desires Depo vs POP.  We also discussed recommendation for weekly PNT.    Recommendations:  Weekly PNT to be scheduled by Dr. Timmons. Office made aware.  Repeat  with MFM to be scheduled.  No further ultrasounds prior to delivery  Added to high risk fetal list for further discussion.

## 2025-06-24 NOTE — TELEPHONE ENCOUNTER
----- Message from KELVIN Benavides sent at 6/24/2025  2:53 PM CDT -----  Regarding: WEEKLY NST OR BPP  Good afternoon,    Ms Drake will need weekly NST OR BPP starting next week.     Thank you,  Caitlin

## 2025-06-25 ENCOUNTER — TELEPHONE (OUTPATIENT)
Dept: OBSTETRICS AND GYNECOLOGY | Facility: CLINIC | Age: 35
End: 2025-06-25
Payer: MEDICAID

## 2025-06-25 DIAGNOSIS — Z36.89 ENCOUNTER FOR FETAL ANATOMIC SURVEY: Primary | ICD-10-CM

## 2025-06-25 NOTE — TELEPHONE ENCOUNTER
Called patient to reschedule her appointment on 7/11/25 and she informed me that she was being induced on 7/17/25 and she no longer needed the appointment, and will continue to see Dr Chamberlain until the induction date.

## 2025-07-02 ENCOUNTER — TELEPHONE (OUTPATIENT)
Dept: OBSTETRICS AND GYNECOLOGY | Facility: CLINIC | Age: 35
End: 2025-07-02
Payer: MEDICAID

## 2025-07-03 ENCOUNTER — PROCEDURE VISIT (OUTPATIENT)
Dept: MATERNAL FETAL MEDICINE | Facility: CLINIC | Age: 35
End: 2025-07-03
Payer: MEDICAID

## 2025-07-03 ENCOUNTER — ROUTINE PRENATAL (OUTPATIENT)
Dept: OBSTETRICS AND GYNECOLOGY | Facility: CLINIC | Age: 35
End: 2025-07-03
Payer: MEDICAID

## 2025-07-03 VITALS
WEIGHT: 174.94 LBS | HEART RATE: 93 BPM | BODY MASS INDEX: 34.16 KG/M2 | SYSTOLIC BLOOD PRESSURE: 101 MMHG | DIASTOLIC BLOOD PRESSURE: 69 MMHG

## 2025-07-03 DIAGNOSIS — Z36.89 ENCOUNTER FOR FETAL ANATOMIC SURVEY: ICD-10-CM

## 2025-07-03 DIAGNOSIS — Z3A.36 36 WEEKS GESTATION OF PREGNANCY: Primary | ICD-10-CM

## 2025-07-03 PROCEDURE — 99999 PR PBB SHADOW E&M-EST. PATIENT-LVL II: CPT | Mod: PBBFAC,,, | Performed by: OBSTETRICS & GYNECOLOGY

## 2025-07-03 PROCEDURE — 87653 STREP B DNA AMP PROBE: CPT | Performed by: OBSTETRICS & GYNECOLOGY

## 2025-07-03 PROCEDURE — 99212 OFFICE O/P EST SF 10 MIN: CPT | Mod: PBBFAC,PO | Performed by: OBSTETRICS & GYNECOLOGY

## 2025-07-03 PROCEDURE — 76819 FETAL BIOPHYS PROFIL W/O NST: CPT | Mod: PBBFAC,PO | Performed by: OBSTETRICS & GYNECOLOGY

## 2025-07-03 NOTE — PROGRESS NOTES
Doing well, no issues   GBS done  Cervix closed   FHT normal    @ 36  wga   IOB labs done  Negative MT21, female Kemi!   Anatomy US done  Glucose negative   did tdap  Did consents   Did 3rd trim labs   GBS done today   History of , desires repeat   Set up at [facility] , post partum set up   Rh negative, got rhogam in hospital   Unsure partners blood type; never did screen  Rhogam done 25  H/o HSV: valtrex at 36 wga, sent in    Baby noted to have ascitics on Us but resolved and now colonic dilation  Recommend delivery at [facility], M will set up    Low iron levels, mild anemia, IV iron set up this week     Growth US: 25 2lb 8 oz 37%; vertex     -pills for birth control (did implant but gave her headaches)   -peds: unsure, discussed choices   -breast peds, requested pump through [employer]     Face to Face time with patient: 20 minutes of total time spent on the encounter, which includes face to face time and non-face to face time preparing to see the patient (eg, review of tests), Obtaining and/or reviewing separately obtained history, Documenting clinical information in the electronic or other health record, Independently interpreting results (not separately reported) and communicating results to the patient/family/caregiver, or Care coordination (not separately reported).                      
PROVIDER:[TOKEN:[[id number]],FOLLOWUP:[1 week]],PROVIDER:[TOKEN:[[id number]],FOLLOWUP:[2 weeks]]

## 2025-07-03 NOTE — LETTER
July 3, 2025    Elvia Nassar  [address]  [facility] [address]         [place] [facility]  [address]  [address]  [facility] [address]  Phone: [phone] July 3, 2025     Patient: Elvia Nassar   YOB: 1990   Date of Visit: 7/3/2025       To Whom It May Concern:    It is my medical opinion that Elvia Nassar is expected to deliver a baby on 7/17/25. Please excuse her , Dave King, from work for 2 weeks after the delivery so he can help her recover.     If you have any questions or concerns, please don't hesitate to call.    Sincerely,        Kelli Timmons MD

## 2025-07-04 LAB — GROUP B STREPTOCOCCUS, PCR (OHS): NEGATIVE

## 2025-07-10 ENCOUNTER — PROCEDURE VISIT (OUTPATIENT)
Dept: MATERNAL FETAL MEDICINE | Facility: CLINIC | Age: 35
End: 2025-07-10
Payer: MEDICAID

## 2025-07-10 ENCOUNTER — ROUTINE PRENATAL (OUTPATIENT)
Dept: OBSTETRICS AND GYNECOLOGY | Facility: CLINIC | Age: 35
End: 2025-07-10
Payer: MEDICAID

## 2025-07-10 ENCOUNTER — PATIENT MESSAGE (OUTPATIENT)
Dept: MATERNAL FETAL MEDICINE | Facility: CLINIC | Age: 35
End: 2025-07-10
Payer: MEDICAID

## 2025-07-10 ENCOUNTER — PATIENT MESSAGE (OUTPATIENT)
Dept: OBSTETRICS AND GYNECOLOGY | Facility: HOSPITAL | Age: 35
End: 2025-07-10
Payer: MEDICAID

## 2025-07-10 VITALS
WEIGHT: 177.69 LBS | SYSTOLIC BLOOD PRESSURE: 112 MMHG | BODY MASS INDEX: 34.7 KG/M2 | HEART RATE: 98 BPM | DIASTOLIC BLOOD PRESSURE: 73 MMHG

## 2025-07-10 DIAGNOSIS — Z36.89 ENCOUNTER FOR FETAL ANATOMIC SURVEY: ICD-10-CM

## 2025-07-10 DIAGNOSIS — Z3A.37 37 WEEKS GESTATION OF PREGNANCY: Primary | ICD-10-CM

## 2025-07-10 LAB
BILIRUB SERPL-MCNC: ABNORMAL MG/DL
BLOOD URINE, POC: ABNORMAL
CLARITY, POC UA: CLEAR
COLOR, POC UA: YELLOW
GLUCOSE UR QL STRIP: ABNORMAL
KETONES UR QL STRIP: ABNORMAL
LEUKOCYTE ESTERASE URINE, POC: ABNORMAL
NITRITE, POC UA: ABNORMAL
PH, POC UA: 7
PROTEIN, POC: ABNORMAL
SPECIFIC GRAVITY, POC UA: 1.01
UROBILINOGEN, POC UA: 0.2

## 2025-07-10 PROCEDURE — 99999PBSHW POCT URINE DIPSTICK WITHOUT MICROSCOPE: Mod: PBBFAC,,,

## 2025-07-10 PROCEDURE — 99212 OFFICE O/P EST SF 10 MIN: CPT | Mod: PBBFAC,PO | Performed by: OBSTETRICS & GYNECOLOGY

## 2025-07-10 PROCEDURE — 81002 URINALYSIS NONAUTO W/O SCOPE: CPT | Mod: PBBFAC,PO | Performed by: OBSTETRICS & GYNECOLOGY

## 2025-07-10 PROCEDURE — 76819 FETAL BIOPHYS PROFIL W/O NST: CPT | Mod: PBBFAC,PO | Performed by: OBSTETRICS & GYNECOLOGY

## 2025-07-10 PROCEDURE — 99999 PR PBB SHADOW E&M-EST. PATIENT-LVL II: CPT | Mod: PBBFAC,,, | Performed by: OBSTETRICS & GYNECOLOGY

## 2025-07-10 NOTE — PROGRESS NOTES
Doing well, no issues     Cervix closed   FHT normal    @ 37  wga   IOB labs done  Negative MT21, female Kemi!   Anatomy US done  Glucose negative   did tdap  Did consents   Did 3rd trim labs   GBS done today   History of , desires repeat   Set up at [facility] , post partum set up   Rh negative, got rhogam in hospital   Unsure partners blood type; never did screen  Rhogam done 25  H/o HSV: valtrex at 38 wga, sent in    Baby noted to have ascitics on Us, had resolves but now a small amount appears to be back and now colonic dilation  Recommend delivery at [facility], MFM will set up for next week, didn't recommend change date based on scant ascities and didn't recommend a sooner NST, weekly BPP was fine  Low iron levels, mild anemia, IV iron set up this week     Growth US: 25 29%; vertex 5lb 12 oz     -pills for birth control (did implant but gave her headaches)   -peds: unsure, discussed choices   -breast peds, requested pump through [facility]     Face to Face time with patient: 20 minutes of total time spent on the encounter, which includes face to face time and non-face to face time preparing to see the patient (eg, review of tests), Obtaining and/or reviewing separately obtained history, Documenting clinical information in the electronic or other health record, Independently interpreting results (not separately reported) and communicating results to the patient/family/caregiver, or Care coordination (not separately reported).

## 2025-07-11 NOTE — TELEPHONE ENCOUNTER
Called patient and discussed small amount of ascites; all question answered. Discussed with MFM and don't recommend any more monitoring, will do  next week.

## 2025-07-14 NOTE — TELEPHONE ENCOUNTER
Refill Routing Note   Medication(s) are not appropriate for processing by [facility] for the following reason(s):        Outside of protocol    [facility] action(s):  Route               Appointments  past 12m or future 3m with PCP    Date Provider   Last Visit   7/10/2025 Kelli Timmons MD   Next Visit   7/24/2025 Kelli Timmons MD   ED visits in past 90 days: 0        Note composed:2:11 PM 07/14/2025

## 2025-07-15 RX ORDER — PRENATAL WITH FERROUS FUM AND FOLIC ACID 3080; 920; 120; 400; 22; 1.84; 3; 20; 10; 1; 12; 200; 27; 25; 2 [IU]/1; [IU]/1; MG/1; [IU]/1; MG/1; MG/1; MG/1; MG/1; MG/1; MG/1; UG/1; MG/1; MG/1; MG/1; MG/1
1 TABLET ORAL DAILY
Qty: 90 TABLET | Refills: 3 | Status: ON HOLD | OUTPATIENT
Start: 2025-07-15 | End: 2026-07-15

## 2025-07-17 ENCOUNTER — ANESTHESIA EVENT (OUTPATIENT)
Dept: OBSTETRICS AND GYNECOLOGY | Facility: OTHER | Age: 35
End: 2025-07-17
Payer: MEDICAID

## 2025-07-17 ENCOUNTER — ANESTHESIA (OUTPATIENT)
Dept: OBSTETRICS AND GYNECOLOGY | Facility: OTHER | Age: 35
End: 2025-07-17
Payer: MEDICAID

## 2025-07-17 ENCOUNTER — HOSPITAL ENCOUNTER (INPATIENT)
Facility: OTHER | Age: 35
LOS: 4 days | Discharge: HOME | End: 2025-07-21
Attending: STUDENT IN AN ORGANIZED HEALTH CARE EDUCATION/TRAINING PROGRAM | Admitting: STUDENT IN AN ORGANIZED HEALTH CARE EDUCATION/TRAINING PROGRAM
Payer: MEDICAID

## 2025-07-17 DIAGNOSIS — Z67.91 RH NEGATIVE STATUS DURING PREGNANCY IN THIRD TRIMESTER: ICD-10-CM

## 2025-07-17 DIAGNOSIS — O26.893 RH NEGATIVE STATUS DURING PREGNANCY IN THIRD TRIMESTER: ICD-10-CM

## 2025-07-17 PROBLEM — B00.9 HSV INFECTION: Status: ACTIVE | Noted: 2025-07-17

## 2025-07-17 LAB
ABSOLUTE EOSINOPHIL (OHS): 0.1 K/UL
ABSOLUTE MONOCYTE (OHS): 0.64 K/UL (ref 0.3–1)
ABSOLUTE NEUTROPHIL COUNT (OHS): 4.2 K/UL (ref 1.8–7.7)
BASOPHILS # BLD AUTO: 0.02 K/UL
BASOPHILS NFR BLD AUTO: 0.3 %
BLOOD GROUP ANTIBODIES SERPL: NORMAL
ERYTHROCYTE [DISTWIDTH] IN BLOOD BY AUTOMATED COUNT: 13.2 % (ref 11.5–14.5)
HCT VFR BLD AUTO: 38.5 % (ref 37–48.5)
HGB BLD-MCNC: 13.2 GM/DL (ref 12–16)
IMM GRANULOCYTES # BLD AUTO: 0.06 K/UL (ref 0–0.04)
IMM GRANULOCYTES NFR BLD AUTO: 0.9 % (ref 0–0.5)
INDIRECT COOMBS: ABNORMAL
LYMPHOCYTES # BLD AUTO: 1.84 K/UL (ref 1–4.8)
MCH RBC QN AUTO: 32.7 PG (ref 27–31)
MCHC RBC AUTO-ENTMCNC: 34.3 G/DL (ref 32–36)
MCV RBC AUTO: 95 FL (ref 82–98)
NUCLEATED RBC (/100WBC) (OHS): 0 /100 WBC
PLATELET # BLD AUTO: 216 K/UL (ref 150–450)
PMV BLD AUTO: 10.8 FL (ref 9.2–12.9)
RBC # BLD AUTO: 4.04 M/UL (ref 4–5.4)
RELATIVE EOSINOPHIL (OHS): 1.5 %
RELATIVE LYMPHOCYTE (OHS): 26.8 % (ref 18–48)
RELATIVE MONOCYTE (OHS): 9.3 % (ref 4–15)
RELATIVE NEUTROPHIL (OHS): 61.2 % (ref 38–73)
RH BLD: ABNORMAL
SPECIMEN OUTDATE: ABNORMAL
T PALLIDUM IGG+IGM SER QL: NEGATIVE
WBC # BLD AUTO: 6.86 K/UL (ref 3.9–12.7)

## 2025-07-17 PROCEDURE — 37000009 HC ANESTHESIA EA ADD 15 MINS: Performed by: STUDENT IN AN ORGANIZED HEALTH CARE EDUCATION/TRAINING PROGRAM

## 2025-07-17 PROCEDURE — 63600175 PHARM REV CODE 636 W HCPCS: Mod: JZ,TB

## 2025-07-17 PROCEDURE — 59514 CESAREAN DELIVERY ONLY: CPT | Mod: AT,,, | Performed by: STUDENT IN AN ORGANIZED HEALTH CARE EDUCATION/TRAINING PROGRAM

## 2025-07-17 PROCEDURE — 71000039 HC RECOVERY, EACH ADD'L HOUR: Performed by: STUDENT IN AN ORGANIZED HEALTH CARE EDUCATION/TRAINING PROGRAM

## 2025-07-17 PROCEDURE — D9220A PRA ANESTHESIA: Mod: ,,, | Performed by: ANESTHESIOLOGY

## 2025-07-17 PROCEDURE — 37000008 HC ANESTHESIA 1ST 15 MINUTES: Performed by: STUDENT IN AN ORGANIZED HEALTH CARE EDUCATION/TRAINING PROGRAM

## 2025-07-17 PROCEDURE — 63600175 PHARM REV CODE 636 W HCPCS

## 2025-07-17 PROCEDURE — 86850 RBC ANTIBODY SCREEN: CPT

## 2025-07-17 PROCEDURE — 25000003 PHARM REV CODE 250

## 2025-07-17 PROCEDURE — 85025 COMPLETE CBC W/AUTO DIFF WBC: CPT

## 2025-07-17 PROCEDURE — 36004724 HC OB OR TIME LEV III - 1ST 15 MIN: Performed by: STUDENT IN AN ORGANIZED HEALTH CARE EDUCATION/TRAINING PROGRAM

## 2025-07-17 PROCEDURE — 86593 SYPHILIS TEST NON-TREP QUANT: CPT

## 2025-07-17 PROCEDURE — 71000033 HC RECOVERY, INTIAL HOUR: Performed by: STUDENT IN AN ORGANIZED HEALTH CARE EDUCATION/TRAINING PROGRAM

## 2025-07-17 PROCEDURE — 86870 RBC ANTIBODY IDENTIFICATION: CPT

## 2025-07-17 PROCEDURE — 11000001 HC ACUTE MED/SURG PRIVATE ROOM

## 2025-07-17 PROCEDURE — 36004725 HC OB OR TIME LEV III - EA ADD 15 MIN: Performed by: STUDENT IN AN ORGANIZED HEALTH CARE EDUCATION/TRAINING PROGRAM

## 2025-07-17 RX ORDER — ONDANSETRON HYDROCHLORIDE 2 MG/ML
4 INJECTION, SOLUTION INTRAVENOUS EVERY 6 HOURS PRN
Status: DISCONTINUED | OUTPATIENT
Start: 2025-07-17 | End: 2025-07-21 | Stop reason: HOSPADM

## 2025-07-17 RX ORDER — PRENATAL WITH FERROUS FUM AND FOLIC ACID 3080; 920; 120; 400; 22; 1.84; 3; 20; 10; 1; 12; 200; 27; 25; 2 [IU]/1; [IU]/1; MG/1; [IU]/1; MG/1; MG/1; MG/1; MG/1; MG/1; MG/1; UG/1; MG/1; MG/1; MG/1; MG/1
1 TABLET ORAL DAILY
Status: DISCONTINUED | OUTPATIENT
Start: 2025-07-17 | End: 2025-07-17

## 2025-07-17 RX ORDER — IBUPROFEN 400 MG/1
800 TABLET, FILM COATED ORAL
Status: DISCONTINUED | OUTPATIENT
Start: 2025-07-18 | End: 2025-07-21 | Stop reason: HOSPADM

## 2025-07-17 RX ORDER — TRANEXAMIC ACID 10 MG/ML
1000 INJECTION, SOLUTION INTRAVENOUS EVERY 30 MIN PRN
Status: DISCONTINUED | OUTPATIENT
Start: 2025-07-17 | End: 2025-07-21 | Stop reason: HOSPADM

## 2025-07-17 RX ORDER — OXYTOCIN 10 [USP'U]/ML
INJECTION, SOLUTION INTRAMUSCULAR; INTRAVENOUS
Status: DISCONTINUED | OUTPATIENT
Start: 2025-07-17 | End: 2025-07-17

## 2025-07-17 RX ORDER — FAMOTIDINE 10 MG/ML
20 INJECTION, SOLUTION INTRAVENOUS
Status: COMPLETED | OUTPATIENT
Start: 2025-07-17 | End: 2025-07-17

## 2025-07-17 RX ORDER — OXYCODONE HYDROCHLORIDE 10 MG/1
10 TABLET ORAL EVERY 4 HOURS PRN
Status: DISCONTINUED | OUTPATIENT
Start: 2025-07-17 | End: 2025-07-21 | Stop reason: HOSPADM

## 2025-07-17 RX ORDER — DEXAMETHASONE SODIUM PHOSPHATE 4 MG/ML
INJECTION, SOLUTION INTRA-ARTICULAR; INTRALESIONAL; INTRAMUSCULAR; INTRAVENOUS; SOFT TISSUE
Status: DISCONTINUED | OUTPATIENT
Start: 2025-07-17 | End: 2025-07-17

## 2025-07-17 RX ORDER — NALBUPHINE HYDROCHLORIDE 10 MG/ML
5 INJECTION INTRAMUSCULAR; INTRAVENOUS; SUBCUTANEOUS ONCE AS NEEDED
Status: DISCONTINUED | OUTPATIENT
Start: 2025-07-17 | End: 2025-07-21 | Stop reason: HOSPADM

## 2025-07-17 RX ORDER — DIPHENHYDRAMINE HCL 25 MG
25 CAPSULE ORAL EVERY 6 HOURS PRN
Status: DISCONTINUED | OUTPATIENT
Start: 2025-07-17 | End: 2025-07-21 | Stop reason: HOSPADM

## 2025-07-17 RX ORDER — SODIUM CHLORIDE, SODIUM LACTATE, POTASSIUM CHLORIDE, CALCIUM CHLORIDE 600; 310; 30; 20 MG/100ML; MG/100ML; MG/100ML; MG/100ML
INJECTION, SOLUTION INTRAVENOUS CONTINUOUS
Status: DISCONTINUED | OUTPATIENT
Start: 2025-07-17 | End: 2025-07-21

## 2025-07-17 RX ORDER — SODIUM CITRATE AND CITRIC ACID MONOHYDRATE 334; 500 MG/5ML; MG/5ML
30 SOLUTION ORAL
Status: COMPLETED | OUTPATIENT
Start: 2025-07-17 | End: 2025-07-17

## 2025-07-17 RX ORDER — DOCUSATE SODIUM 100 MG/1
200 CAPSULE, LIQUID FILLED ORAL 2 TIMES DAILY
Status: DISCONTINUED | OUTPATIENT
Start: 2025-07-17 | End: 2025-07-21 | Stop reason: HOSPADM

## 2025-07-17 RX ORDER — OXYCODONE HYDROCHLORIDE 5 MG/1
5 TABLET ORAL EVERY 4 HOURS PRN
Status: DISCONTINUED | OUTPATIENT
Start: 2025-07-17 | End: 2025-07-21 | Stop reason: HOSPADM

## 2025-07-17 RX ORDER — DIPHENHYDRAMINE HYDROCHLORIDE 50 MG/ML
12.5 INJECTION, SOLUTION INTRAMUSCULAR; INTRAVENOUS
Status: DISCONTINUED | OUTPATIENT
Start: 2025-07-17 | End: 2025-07-21 | Stop reason: HOSPADM

## 2025-07-17 RX ORDER — HYDROCORTISONE 25 MG/G
CREAM TOPICAL 3 TIMES DAILY PRN
Status: DISCONTINUED | OUTPATIENT
Start: 2025-07-17 | End: 2025-07-21 | Stop reason: HOSPADM

## 2025-07-17 RX ORDER — PRENATAL WITH FERROUS FUM AND FOLIC ACID 3080; 920; 120; 400; 22; 1.84; 3; 20; 10; 1; 12; 200; 27; 25; 2 [IU]/1; [IU]/1; MG/1; [IU]/1; MG/1; MG/1; MG/1; MG/1; MG/1; MG/1; UG/1; MG/1; MG/1; MG/1; MG/1
1 TABLET ORAL DAILY
Status: DISCONTINUED | OUTPATIENT
Start: 2025-07-17 | End: 2025-07-21 | Stop reason: HOSPADM

## 2025-07-17 RX ORDER — PHENYLEPHRINE HCL IN 0.9% NACL 1 MG/10 ML
SYRINGE (ML) INTRAVENOUS
Status: DISCONTINUED | OUTPATIENT
Start: 2025-07-17 | End: 2025-07-17

## 2025-07-17 RX ORDER — LANOLIN ALCOHOL/MO/W.PET/CERES
1 CREAM (GRAM) TOPICAL DAILY
Status: DISCONTINUED | OUTPATIENT
Start: 2025-07-17 | End: 2025-07-21 | Stop reason: HOSPADM

## 2025-07-17 RX ORDER — ACETAMINOPHEN 325 MG/1
650 TABLET ORAL
Status: DISCONTINUED | OUTPATIENT
Start: 2025-07-17 | End: 2025-07-21 | Stop reason: HOSPADM

## 2025-07-17 RX ORDER — ACETAMINOPHEN 500 MG
1000 TABLET ORAL
Status: COMPLETED | OUTPATIENT
Start: 2025-07-17 | End: 2025-07-17

## 2025-07-17 RX ORDER — MISOPROSTOL 200 UG/1
800 TABLET ORAL ONCE AS NEEDED
Status: DISCONTINUED | OUTPATIENT
Start: 2025-07-17 | End: 2025-07-21 | Stop reason: HOSPADM

## 2025-07-17 RX ORDER — CARBOPROST TROMETHAMINE 250 UG/ML
250 INJECTION, SOLUTION INTRAMUSCULAR
Status: DISCONTINUED | OUTPATIENT
Start: 2025-07-17 | End: 2025-07-21 | Stop reason: HOSPADM

## 2025-07-17 RX ORDER — SODIUM CHLORIDE, SODIUM LACTATE, POTASSIUM CHLORIDE, CALCIUM CHLORIDE 600; 310; 30; 20 MG/100ML; MG/100ML; MG/100ML; MG/100ML
INJECTION, SOLUTION INTRAVENOUS CONTINUOUS PRN
Status: DISCONTINUED | OUTPATIENT
Start: 2025-07-17 | End: 2025-07-17

## 2025-07-17 RX ORDER — FENTANYL CITRATE 50 UG/ML
INJECTION, SOLUTION INTRAMUSCULAR; INTRAVENOUS
Status: DISCONTINUED | OUTPATIENT
Start: 2025-07-17 | End: 2025-07-17

## 2025-07-17 RX ORDER — SIMETHICONE 80 MG
1 TABLET,CHEWABLE ORAL EVERY 6 HOURS PRN
Status: DISCONTINUED | OUTPATIENT
Start: 2025-07-17 | End: 2025-07-21 | Stop reason: HOSPADM

## 2025-07-17 RX ORDER — OXYTOCIN-SODIUM CHLORIDE 0.9% IV SOLN 30 UNIT/500ML 30-0.9/5 UT/ML-%
95 SOLUTION INTRAVENOUS CONTINUOUS PRN
Status: DISCONTINUED | OUTPATIENT
Start: 2025-07-17 | End: 2025-07-21

## 2025-07-17 RX ORDER — MORPHINE SULFATE 0.5 MG/ML
INJECTION, SOLUTION EPIDURAL; INTRATHECAL; INTRAVENOUS
Status: DISCONTINUED | OUTPATIENT
Start: 2025-07-17 | End: 2025-07-17

## 2025-07-17 RX ORDER — ONDANSETRON 8 MG/1
8 TABLET, ORALLY DISINTEGRATING ORAL EVERY 8 HOURS PRN
Status: DISCONTINUED | OUTPATIENT
Start: 2025-07-17 | End: 2025-07-21 | Stop reason: HOSPADM

## 2025-07-17 RX ORDER — KETOROLAC TROMETHAMINE 30 MG/ML
30 INJECTION, SOLUTION INTRAMUSCULAR; INTRAVENOUS
Status: COMPLETED | OUTPATIENT
Start: 2025-07-17 | End: 2025-07-18

## 2025-07-17 RX ORDER — PROCHLORPERAZINE EDISYLATE 5 MG/ML
5 INJECTION INTRAMUSCULAR; INTRAVENOUS EVERY 6 HOURS PRN
Status: DISCONTINUED | OUTPATIENT
Start: 2025-07-17 | End: 2025-07-21 | Stop reason: HOSPADM

## 2025-07-17 RX ORDER — VALACYCLOVIR HYDROCHLORIDE 500 MG/1
500 TABLET, FILM COATED ORAL 2 TIMES DAILY
Status: DISCONTINUED | OUTPATIENT
Start: 2025-07-17 | End: 2025-07-18

## 2025-07-17 RX ORDER — AMOXICILLIN 250 MG
1 CAPSULE ORAL NIGHTLY PRN
Status: DISCONTINUED | OUTPATIENT
Start: 2025-07-17 | End: 2025-07-21 | Stop reason: HOSPADM

## 2025-07-17 RX ORDER — ONDANSETRON HYDROCHLORIDE 2 MG/ML
INJECTION, SOLUTION INTRAMUSCULAR; INTRAVENOUS
Status: DISCONTINUED | OUTPATIENT
Start: 2025-07-17 | End: 2025-07-17

## 2025-07-17 RX ORDER — METHYLERGONOVINE MALEATE 0.2 MG/ML
200 INJECTION INTRAVENOUS ONCE AS NEEDED
Status: DISCONTINUED | OUTPATIENT
Start: 2025-07-17 | End: 2025-07-21 | Stop reason: HOSPADM

## 2025-07-17 RX ADMIN — KETOROLAC TROMETHAMINE 30 MG: 30 INJECTION, SOLUTION INTRAMUSCULAR at 11:07

## 2025-07-17 RX ADMIN — OXYTOCIN 3 UNITS: 10 INJECTION, SOLUTION INTRAMUSCULAR; INTRAVENOUS at 10:07

## 2025-07-17 RX ADMIN — Medication 50 MCG: at 10:07

## 2025-07-17 RX ADMIN — KETOROLAC TROMETHAMINE 30 MG: 30 INJECTION, SOLUTION INTRAMUSCULAR at 05:07

## 2025-07-17 RX ADMIN — ACETAMINOPHEN 650 MG: 325 TABLET ORAL at 09:07

## 2025-07-17 RX ADMIN — PHENYLEPHRINE HYDROCHLORIDE 0.5 MCG/KG/MIN: 10 INJECTION INTRAVENOUS at 10:07

## 2025-07-17 RX ADMIN — OXYCODONE HYDROCHLORIDE 10 MG: 10 TABLET ORAL at 01:07

## 2025-07-17 RX ADMIN — Medication 0.1 MG: at 10:07

## 2025-07-17 RX ADMIN — DEXTROSE 2 G: 50 INJECTION, SOLUTION INTRAVENOUS at 10:07

## 2025-07-17 RX ADMIN — OXYTOCIN 3 UNITS: 10 INJECTION, SOLUTION INTRAMUSCULAR; INTRAVENOUS at 11:07

## 2025-07-17 RX ADMIN — ACETAMINOPHEN 1000 MG: 500 TABLET, FILM COATED ORAL at 09:07

## 2025-07-17 RX ADMIN — SODIUM CHLORIDE, SODIUM LACTATE, POTASSIUM CHLORIDE, AND CALCIUM CHLORIDE: .6; .31; .03; .02 INJECTION, SOLUTION INTRAVENOUS at 11:07

## 2025-07-17 RX ADMIN — SODIUM CHLORIDE, SODIUM LACTATE, POTASSIUM CHLORIDE, AND CALCIUM CHLORIDE: .6; .31; .03; .02 INJECTION, SOLUTION INTRAVENOUS at 09:07

## 2025-07-17 RX ADMIN — SODIUM CITRATE AND CITRIC ACID MONOHYDRATE 30 ML: 500; 334 SOLUTION ORAL at 09:07

## 2025-07-17 RX ADMIN — ONDANSETRON 4 MG: 2 INJECTION INTRAMUSCULAR; INTRAVENOUS at 10:07

## 2025-07-17 RX ADMIN — OXYTOCIN-SODIUM CHLORIDE 0.9% IV SOLN 30 UNIT/500ML 95 MILLI-UNITS/MIN: 30-0.9/5 SOLUTION at 12:07

## 2025-07-17 RX ADMIN — VALACYCLOVIR HYDROCHLORIDE 500 MG: 500 TABLET, FILM COATED ORAL at 09:07

## 2025-07-17 RX ADMIN — PROCHLORPERAZINE EDISYLATE 5 MG: 5 INJECTION INTRAMUSCULAR; INTRAVENOUS at 02:07

## 2025-07-17 RX ADMIN — ACETAMINOPHEN 650 MG: 325 TABLET ORAL at 04:07

## 2025-07-17 RX ADMIN — DOCUSATE SODIUM 200 MG: 100 CAPSULE, LIQUID FILLED ORAL at 09:07

## 2025-07-17 RX ADMIN — DEXAMETHASONE SODIUM PHOSPHATE 4 MG: 4 INJECTION, SOLUTION INTRAMUSCULAR; INTRAVENOUS at 10:07

## 2025-07-17 RX ADMIN — FENTANYL CITRATE 10 MCG: 50 INJECTION, SOLUTION INTRAMUSCULAR; INTRAVENOUS at 10:07

## 2025-07-17 RX ADMIN — FAMOTIDINE 20 MG: 10 INJECTION, SOLUTION INTRAVENOUS at 09:07

## 2025-07-17 NOTE — ANESTHESIA PREPROCEDURE EVALUATION
"[facility]  Anesthesia Pre-Operative Evaluation         Patient Name: Elvia Nassar  YOB: 1990  MRN: [id number]    2025      Elvia Nassar is a 35 y.o. female  @ 38w5d who presents for repeat CS. Current IUP c/b hx prior CS, HSV on valtrex, fetus with ascitics on US w/ colonic dilation.     No bleeding/clotting disorders, cardiopulmonary pathology or spine abnormalities noted on chart review.     OB History    Para Term  AB Living   2 1 1   1   SAB IAB Ectopic Multiple Live Births       1      # Outcome Date GA Lbr Keshav/2nd Weight Sex Type Anes PTL Lv   2 Current            1 Term 10/11/07 40w0d  2.25 kg (4 lb 15.4 oz) M CS-Unspec   ANGELA      Birth Comments: Pelvimetry performed in [facility], was told she cannot labor       Review of patient's allergies indicates:  No Known Allergies    Wt Readings from Last 1 Encounters:   07/10/25 1053 80.6 kg (177 lb 11.1 oz)       BP Readings from Last 3 Encounters:   07/10/25 112/73   25 101/69   25 114/72       Problem List[1]    Past Surgical History:   Procedure Laterality Date     SECTION      Reports she could not labor dur to pelvimetry in [facility]       Social History[2]      Chemistry    No results found for: "NA", "K", "CL", "CO2", "BUN", "CREATININE", "GLU" No results found for: "CALCIUM", "ALKPHOS", "AST", "ALT", "BILITOT", "ESTGFRAFRICA", "EGFRNONAA"         Lab Results   Component Value Date    WBC 6.14 2025    HGB 11.5 (L) 2025    HCT 35.4 (L) 2025    MCV 98 2025     2025       No results for input(s): "PT", "INR", "PROTIME", "APTT" in the last 72 hours.            Pre-op Assessment    I have reviewed the Patient Summary Reports.     I have reviewed the Nursing Notes. I have reviewed the NPO Status.   I have reviewed the Medications.     Review of Systems  Anesthesia Hx:  No problems with previous Anesthesia               Denies " Personal Hx of Anesthesia complications.                    Cardiovascular:  Cardiovascular Normal                                              Pulmonary:  Pulmonary Normal                       Renal/:  Renal/ Normal                 Hepatic/GI:  Hepatic/GI Normal                    Neurological:  Neurology Normal                                          Physical Exam  General: Well nourished, Cooperative and Alert    Airway:  Mallampati: III / II  Mouth Opening: Normal  Tongue: Normal  Neck ROM: Normal ROM    Dental:  Intact    Chest/Lungs:  Normal Respiratory Rate        Anesthesia Plan  Type of Anesthesia, risks & benefits discussed:    Anesthesia Type: Epidural, Spinal, CSE  Intra-op Monitoring Plan: Standard ASA Monitors  Post Op Pain Control Plan: multimodal analgesia and IV/PO Opioids PRN  Induction:  IV  Airway Plan: Direct, Post-Induction  Informed Consent: Informed consent signed with the Patient and all parties understand the risks and agree with anesthesia plan.  All questions answered.   ASA Score: 2  Day of Surgery Review of History & Physical: H&P Update referred to the surgeon/provider.    Ready For Surgery From Anesthesia Perspective.     .           [1]   Patient Active Problem List  Diagnosis    Fetal ascites causing disproportion    Abnormal prenatal ultrasound    Encounter for screening for congenital cardiac abnormalities in fetus    Iron deficiency    Decreased fetal movements in third trimester   [2]   Social History  Socioeconomic History    Marital status:    Tobacco Use    Smoking status: Never    Smokeless tobacco: Never   Vaping Use    Vaping status: Never Used   Substance and Sexual Activity    Alcohol use: Not Currently    Drug use: Never    Sexual activity: Not Currently     Partners: Male     Social Drivers of Health     Financial Resource Strain: Low Risk  (5/29/2025)    Overall Financial Resource Strain (CARDIA)     Difficulty of Paying Living Expenses: Not hard at all    Food Insecurity: No Food Insecurity (5/29/2025)    Hunger Vital Sign     Worried About Running Out of Food in the Last Year: Never true     Ran Out of Food in the Last Year: Never true   Transportation Needs: No Transportation Needs (5/29/2025)    PRAPARE - Transportation     Lack of Transportation (Medical): No     Lack of Transportation (Non-Medical): No   Physical Activity: Insufficiently Active (5/26/2025)    Exercise Vital Sign     Days of Exercise per Week: 3 days     Minutes of Exercise per Session: 20 min   Stress: No Stress Concern Present (5/29/2025)    [employer] - Occupational Stress Questionnaire     Feeling of Stress : Not at all   Housing Stability: Low Risk  (5/29/2025)    Housing Stability Vital Sign     Unable to Pay for Housing in the Last Year: No     Number of Times Moved in the Last Year: 0     Homeless in the Last Year: No

## 2025-07-17 NOTE — L&D DELIVERY NOTE
[facility] - Labor & Delivery   Section   Operative Note     Procedure:   1. Repeat  Section via pfannenstiel skin incision    Indications:   1. History of prior CS    Pre-operative Diagnosis:   1. IUP at 38 week 5 day pregnancy  2. History of prior  section    Post-operative Diagnosis:   1. Same  2. S/p repeat low transverse  section    Surgeon: Dr. Hoffmann     Assistants: Meenu aPrks PGY6, Oly Campos PGY3    Anesthesia: Spinal anesthesia    Findings:    1. Single viable  female infant  2. Normal appearing uterus, ovaries, and fallopian tubes.  3. Normal placenta  4. Excellent hemostasis following closure of each tissue layer     Estimated Blood Loss:  425 mL           Total IV Fluids: 1000 mL     UOP: 100 mL    Specimens:   1. Single viable female infant  2. Placenta, which was discarded     PreOp CBC:   Lab Results   Component Value Date    WBC 6.86 2025    HGB 13.2 2025    HCT 38.5 2025    MCV 95 2025     2025                     Complications:  None; patient tolerated the procedure well.           Disposition: PACU - hemodynamically stable.           Condition: stable    Procedure Details   The patient was seen in the Holding Room. The risks, benefits, complications, treatment options, and expected outcomes were discussed with the patient. The patient concurred with the proposed plan, giving informed consent. The patient was taken to Operating Room, identified as Elvia Nassar and the procedure verified as Repeat low transverse  Delivery. A Time Out was held and the above information confirmed.    After induction of anesthesia, the patient was prepped and draped in the usual sterile manner while placed in a dorsal supine position with a left lateral tilt. A crump catheter was also placed per nursing. SCDs were on and cycling. Preoperative antibiotics Ancef 2 g was administered. An allis test was performed confirming  adequate anesthesia. A Pfannenstiel incision was made and carried down through the subcutaneous tissue to the fascia. Fascial incision was made and extended transversely. The fascia was grasped with kocher clamps and  from the underlying rectus tissue superiorly and inferiorly. The peritoneum was identified, found to be free of adherent bowel, and entered bluntly. Peritoneal incision was extended longitudinally. The vesico-uterine peritoneum was identified, and bladder blade was inserted to keep the bladder out of the operative field. A low transverse uterine incision was made with knife and extended bluntly. The amniotic sac was ruptured bluntly and the infant was noted to be in vertex DENNYS presentation. The vertex was brought to the incision and elevated out of the pelvis. The patient delivered a single viable female infant without difficulty. After the umbilical cord was clamped and cut, cord blood was obtained for evaluation. The placenta was removed intact, appeared normal, and was discarded. The uterus was exteriorized. The uterine incision was closed with running locked sutures of #1 Vicryl. Hemostasis was observed. The uterine outline, tubes and ovaries appeared normal. The uterus was returned to the abdominal cavity. Incision was reinspected and good hemostasis was noted. The subfascial space was inspected and excellent hemostasis achieved. The fascia was then reapproximated with running sutures of #1 Vicryl. The subcutaneous fat was reapproximated with 2-0 Vicryl, and skin was reapproximated with 4-0 monocryl.    Instrument, sponge, and needle counts were correct prior the abdominal closure and at the conclusion of the case.     Pt tolerated procedure well and was in stable condition after the procedure. Dr. Hoffmann was present for the entire procedure.          Specimens:   Specimen (24h ago, onward)      None            Condition: Good    VTE Risk Mitigation (From admission, onward)            Ordered     Reason for No Pharmacological VTE Prophylaxis  Once        Question:  Reasons:  Answer:  Neuraxial Anesthesia    25 1041     Place sequential compression device  Until discontinued        Comments: Discontinue when catheter removed    25 0921                    Disposition: PACU - hemodynamically stable.    Attestation: Good         Delivery Information for Girl Elvia Nassar    Birth information:  YOB: [birth year]25   Time of birth: 10:31 AM   Sex: female   Head Delivery Date/Time: 2025 10:31 AM   Delivery type: , Low Transverse   Gestational Age: 38w5d       Delivery Providers    Delivering clinician: Jessica Hoffmann MD   Provider Role    Meenu Parks MD Fellow    Oly Campos MD Resident    Kayce Harrison RN Circulator    Alyssa Ruby ST Scrub Person              Measurements    Weight:   Length:          Apgars    Living status:   Apgar Component Scores:  1 min.:  5 min.:  10 min.:  15 min.:  20 min.:    Skin color:         Heart rate:         Reflex irritability:         Muscle tone:         Respiratory effort:         Total:                  Operative Delivery    Forceps attempted?: No  Vacuum extractor attempted?: No         Shoulder Dystocia    Shoulder dystocia present?: No           Presentation    Presentation: Vertex  Position: Occiput Anterior           Interventions/Resuscitation    Method: NICU Attended       Cord    Vessels: 3 vessels  Complications: None  Delayed Cord Clamping?: Yes  Cord Blood Disposition: Sent with Baby  Gases Sent?: No  Stem Cell Collection (by MD): No       Placenta    Placenta delivery date/time: 2025 10:34  Placenta removal: Spontaneous  Placenta appearance: Intact  Placenta disposition: Donation           Labor Events:       labor: No     Labor Onset Date/Time:         Dilation Complete Date/Time:         Start Pushing Date/Time:         Start Pushing Date/Time:       Rupture Date/Time:             Rupture type:          Fluid Amount:       Fluid Color:                steroids: None     Antibiotics given for GBS: No     Induction:       Indications for induction:        Augmentation:       Indications for augmentation:       Labor complications: None     Additional complications:          Cervical ripening:                     Delivery:      Episiotomy:       Indication for Episiotomy:       Perineal Lacerations:   Repaired:      Periurethral Laceration:   Repaired:     Labial Laceration:   Repaired:     Sulcus Laceration:   Repaired:     Vaginal Laceration:   Repaired:     Cervical Laceration:   Repaired:     Repair suture:       Repair # of packets:       Last Value - EBL - Nursing (mL):       Sum - EBL - Nursing (mL): 0     Last Value - EBL - Anesthesia (mL):      Calculated QBL (mL): 415     Running total QBL (mL): 415     Vaginal Sweep Performed:       Surgicount Correct:       Vaginal Packing:   Quantity:       Other providers:       Anesthesia    Method: Spinal          Details (if applicable):  Trial of Labor No    Categorization: Repeat    Priority: Routine   Indications for : Prior Uterine Surgery;Known/Suspected Fetal Anomaly   Incision Type: low transverse     Additional  information:  Forceps:    Vacuum:    Breech:    Observed anomalies    Other (Comments):

## 2025-07-17 NOTE — SUBJECTIVE & OBJECTIVE
Interval History: ***    Scheduled Meds:   acetaminophen  650 mg Oral Q6H    docusate sodium  200 mg Oral BID    ferrous sulfate  1 tablet Oral Daily    ketorolac  30 mg Intravenous Q6H    Followed by    [START ON 7/18/2025] ibuprofen  800 mg Oral Q8H    prenatal vitamin  1 tablet Oral Daily    valACYclovir  500 mg Oral BID     Continuous Infusions:   lactated ringers   Intravenous Continuous        lactated ringers   Intravenous Continuous        oxytocin  95 krunal-units/min Intravenous Continuous PRN 95 mL/hr at 07/17/25 1231 95 krunal-units/min at 07/17/25 1231     PRN Meds:  Current Facility-Administered Medications:     carboprost, 250 mcg, Intramuscular, Q15 Min PRN    diphenhydrAMINE, 25 mg, Oral, Q6H PRN    diphenhydrAMINE, 12.5 mg, Intravenous, Q20 Min PRN    hydrocortisone, , Rectal, TID PRN    lanolin, , Topical (Top), PRN    measles, mumps and rubella vaccine, 0.5 mL, Subcutaneous, vaccine x 1 dose    methylergonovine, 200 mcg, Intramuscular, Once PRN    miSOPROStoL, 800 mcg, Oral, Once PRN    miSOPROStoL, 800 mcg, Rectal, Once PRN    nalbuphine, 5 mg, Intravenous, Once PRN    ondansetron, 8 mg, Oral, Q8H PRN    ondansetron, 4 mg, Intravenous, Q6H PRN    oxyCODONE, 5 mg, Oral, Q4H PRN    oxyCODONE, 10 mg, Oral, Q4H PRN    oxytocin, 95 krunal-units/min, Intravenous, Continuous PRN    prochlorperazine, 5 mg, Intravenous, Q6H PRN    senna-docusate, 1 tablet, Oral, Nightly PRN    simethicone, 1 tablet, Oral, Q6H PRN    DIPH,PERTUSS(ACELL),TET VACCINE (ADULT)(BOOSTRIX,ADACEL), 0.5 mL, Intramuscular, vaccine x 1 dose    tranexamic acid (CYKLOKAPRON) infusion, 1,000 mg, Intravenous, Q30 Min PRN    Review of Systems  Objective:     Vital Signs (Most Recent):  Temp: 98 °F (36.7 °C) (07/17/25 0910)  Pulse: 96 (07/17/25 1237)  Resp: 16 (07/17/25 0910)  BP: (!) 120/58 (07/17/25 1237)  SpO2: 100 % (07/17/25 1230) Vital Signs (24h Range):  Temp:  [98 °F (36.7 °C)] 98 °F (36.7 °C)  Pulse:  [69-96] 96  Resp:  [16]  "16  SpO2:  [97 %-100 %] 100 %  BP: ()/(54-75) 120/58     Patient Vitals for the past 72 hrs (Last 3 readings):   Weight   07/17/25 1115 23826 g (177 lb 11.1 oz)     Body mass index is 34.7 kg/m².    Intake/Output - Last 3 Shifts         07/15 0700 07/16 0659 07/16 0700 07/17 0659 07/17 0700 07/18 0659    I.V. (mL/kg)   1100 (13.6)    Total Intake(mL/kg)   1100 (13.6)    Urine (mL/kg/hr)   100    Blood   615    Total Output   715    Net   +385                   Lines/Drains/Airways       Drain  Duration                  Urethral Catheter 07/17/25 1010 Non-latex;Straight-tip 16 Fr. <1 day              Peripheral Intravenous Line  Duration             Peripheral IV 07/17/25 0908 18 G Anterior;Left Forearm <1 day                       Physical Exam       Significant Labs:  No results for input(s): "POCTGLUCOSE" in the last 48 hours.    {Results:34498}    Significant Imaging: {Imaging Review:[id number]}  "

## 2025-07-17 NOTE — TRANSFER OF CARE
Anesthesia Transfer of Care Note    Patient: Elvia Nassar    Procedure(s) Performed: Procedure(s) (LRB):   SECTION (N/A)    Patient location: Labor and Delivery    Anesthesia Type: spinal    Transport from OR: Transported from OR on room air with adequate spontaneous ventilation    Post pain: adequate analgesia    Post assessment: no apparent anesthetic complications    Post vital signs: stable    Level of consciousness: awake and alert    Nausea/Vomiting: no nausea/vomiting    Complications: none    Transfer of care protocol was followed      Last vitals: Visit Vitals  /75   Pulse 75   Temp 36.7 °C (98 °F) (Axillary)   Resp 16   Ht 5' (1.524 m)   Wt 80.6 kg (177 lb 11.1 oz)   LMP 10/18/2024   SpO2 98%   Breastfeeding No   BMI 34.70 kg/m²

## 2025-07-17 NOTE — LACTATION NOTE
07/17/25 1615   Maternal Assessment   Breast Shape Bilateral:;round   Breast Density Bilateral:;soft   Areola Bilateral:;elastic   Nipples Bilateral:;everted   Maternal Infant Feeding   Maternal Emotional State assist needed   Infant Positioning clutch/football   Signs of Milk Transfer audible swallow;infant jaw motion present   Latch Assistance yes     Interpreting services used. Assisted pt with breastfeeding position and latch. Pt latched to right breast in football hold. Good tugs and pulls observed. Colostrum easily expressed into baby's mouth. Basic breastfeeding education provided. Questions answered.    Pt  THE [facility] states wife has decided to us Dr. Efren Elliott as PCP, however their first appt is on 8/24/2022.  THE [facility] is asking if pt can get a refill for Risedronate Sodium 150 MG in the meantime until they are able to see Dr. Efren Elliott?

## 2025-07-17 NOTE — H&P
HISTORY AND PHYSICAL                                                OBSTETRICS          Subjective:       Elvia Nassar is a 35 y.o.  female with IUP at 38w5d weeks gestation who presents for planned rLTCS.    Patient denies contractions, denies vaginal bleeding, denies LOF.   Fetal Movement: normal.    This IUP is complicated by RH neg, HSV, fetal large bowel dilation, h/o csx1.    Review of Systems   Constitutional:  Negative for chills and fever.   HENT:  Negative for nasal congestion.    Eyes:  Negative for visual disturbance.   Respiratory:  Negative for shortness of breath.    Cardiovascular:  Negative for chest pain.   Gastrointestinal:  Negative for abdominal pain, nausea and vomiting.   Genitourinary:  Negative for dysuria, pelvic pain and vaginal bleeding.   Musculoskeletal:  Negative for back pain.   Integumentary:  Negative for rash.   Neurological:  Negative for syncope.   Psychiatric/Behavioral:  Negative for depression.        PMHx: No past medical history on file.    PSHx:   Past Surgical History:   Procedure Laterality Date     SECTION      Reports she could not labor dur to pelvimetry in [facility]       All: Review of patient's allergies indicates:  No Known Allergies    Meds: Prescriptions Prior to Admission[1]    SH: Social History[2]    FH:   Family History   Problem Relation Name Age of Onset    Ovarian cancer Paternal Grandmother      Genetic Disorder Neg Hx      Breast cancer Neg Hx      Colon cancer Neg Hx         OBHx:   OB History    Para Term  AB Living   2 1 1 0 0 1   SAB IAB Ectopic Multiple Live Births   0 0 0 0 1      # Outcome Date GA Lbr Keshav/2nd Weight Sex Type Anes PTL Lv   2 Current            1 Term 10/11/07 40w0d  2.25 kg (4 lb 15.4 oz) M CS-Unspec   ANGELA      Birth Comments: Pelvimetry performed in [facility], was told she cannot labor       Objective:       BP (!) 116/59   Pulse 86   Temp 98 °F (36.7 °C) (Axillary)   Resp 16   LMP  10/18/2024   SpO2 97%   Breastfeeding No     Vitals:    25 0908 25 0910 25 0913 25 0914   BP:  (!) 116/59     Pulse: 88 86 87 86   Resp:  16     Temp:  98 °F (36.7 °C)     TempSrc:  Axillary     SpO2: 98%  97%        General:   alert, appears stated age and cooperative, no apparent distress   HENT:  normocephalic, atraumatic   Eyes:  extraocular movements and conjunctivae normal   Neck:  supple, range of motion normal, no thyromegaly   Lungs:   no respiratory distress   Heart:   regular rate   Abdomen:  soft, non-tender, non-distended but gravid, no rebound or guarding    Extremities negative edema, negative erythema   FHT: Baseline 120, moderate BTBV, +accels, -decels;  Cat 1 (reassuring)                 TOCO: Q 7 minutes   Presentations: cephalic by ultrasound   Cervix:  Deferred                            Sterile Speculum Exam: to be done in OR for h/o HSV    EFW by Leopold's: 7#    Recent Growth Scan: EFW 2616g 29%     Lab Review  Blood Type O NEG  GBBS: negative  Rubella: Immune  Trep: NR  HIV: negative  HepB: negative       Assessment:       38w5d weeks gestation with here for planned repeat     Active Hospital Problems    Diagnosis  POA    *Delivery by elective  section [O82]  Yes    HSV infection [B00.9]  Unknown    Rh negative status during pregnancy in third trimester [O26.893, Z67.91]  Yes    Iron deficiency [E61.1]  Yes    Fetal ascites causing disproportion [O33.7XX0]  Yes      Resolved Hospital Problems   No resolved problems to display.          Plan:   Planned rLTCS   Risks, benefits, alternatives and possible complications have been discussed in detail with the patient.   - Consents signed and to chart via   - Admit to Labor and Delivery unit   - Epidural per Anesthesia  - Draw CBC, T&S  - Notify Staff  - EFW 2616g 29%     Fetal Large Bowel dilation.  - Most recent u/s showed bowel dilation measuring 28mm at its greatest.   - NICU  aware    HSV  - on valtrex  - sepculum exam to be performed in OR    RH neg  - s/p rhogam at 28 weeks  - rhogam w/u pp     Contraception: Depo vs POPs    Post-Partum Hemorrhage risk - medium    Jami Fernando MD PGY-4  Obstetrics and Gynecology  [facility]         [1]   Medications Prior to Admission   Medication Sig Dispense Refill Last Dose/Taking    ferrous sulfate 325 (65 FE) MG EC tablet Take 1 tablet (325 mg total) by mouth every other day. (Patient not taking: Reported on 6/24/2025) 60 tablet 3     PNV,calcium 72/iron/folic acid (PRENATAL VITAMIN) Tab Take 1 tablet by mouth once daily. 90 tablet 3     valACYclovir (VALTREX) 500 MG tablet Take 1 tablet (500 mg total) by mouth 2 (two) times daily. (Patient not taking: Reported on 6/2/2025) 60 tablet 11    [2]   Social History  Socioeconomic History    Marital status:    Tobacco Use    Smoking status: Never    Smokeless tobacco: Never   Vaping Use    Vaping status: Never Used   Substance and Sexual Activity    Alcohol use: Not Currently    Drug use: Never    Sexual activity: Not Currently     Partners: Male     Social Drivers of Health     Financial Resource Strain: Low Risk  (7/17/2025)    Overall Financial Resource Strain (CARDIA)     Difficulty of Paying Living Expenses: Not hard at all   Food Insecurity: No Food Insecurity (7/17/2025)    Hunger Vital Sign     Worried About Running Out of Food in the Last Year: Never true     Ran Out of Food in the Last Year: Never true   Transportation Needs: No Transportation Needs (7/17/2025)    PRAPARE - Transportation     Lack of Transportation (Medical): No     Lack of Transportation (Non-Medical): No   Physical Activity: Insufficiently Active (5/26/2025)    Exercise Vital Sign     Days of Exercise per Week: 3 days     Minutes of Exercise per Session: 20 min   Stress: No Stress Concern Present (7/17/2025)    [employer] [place] [employer] - Occupational Stress Questionnaire      Feeling of Stress : Not at all   Housing Stability: Low Risk  (7/17/2025)    Housing Stability Vital Sign     Unable to Pay for Housing in the Last Year: No     Number of Times Moved in the Last Year: 0     Homeless in the Last Year: No

## 2025-07-17 NOTE — ANESTHESIA PROCEDURE NOTES
Spinal    Diagnosis: IUP  Patient location during procedure: OR  Start time: 7/17/2025 10:02 AM  Timeout: 7/17/2025 10:00 AM  End time: 7/17/2025 10:05 AM    Staffing  Authorizing Provider: Rosalba Moscoso MD  Performing Provider: Dania Miranda MD    Staffing  Performed by: Dania Miranda MD  Authorized by: Rosalba Moscoso MD    Preanesthetic Checklist  Completed: patient identified, IV checked, site marked, risks and benefits discussed, surgical consent, monitors and equipment checked, pre-op evaluation and timeout performed  Spinal Block  Patient position: sitting  Prep: ChloraPrep  Patient monitoring: continuous pulse ox and frequent blood pressure checks  Approach: midline  Location: L4-5  Injection technique: single shot  CSF Fluid: clear free-flowing CSF  Needle  Needle type: Malik   Needle gauge: 25 G  Needle length: 5 in  Additional Documentation: incremental injection and negative aspiration for heme  Needle localization: anatomical landmarks  Assessment  Sensory level: T4   Dermatomal levels determined by pinch or prick  Ease of block: easy  Patient's tolerance of the procedure: comfortable throughout block

## 2025-07-18 PROBLEM — O33.7XX0 FETAL ASCITES CAUSING DISPROPORTION: Status: RESOLVED | Noted: 2025-04-10 | Resolved: 2025-07-18

## 2025-07-18 PROBLEM — B00.9 HSV INFECTION: Status: RESOLVED | Noted: 2025-07-17 | Resolved: 2025-07-18

## 2025-07-18 LAB
ABSOLUTE EOSINOPHIL (OHS): 0.04 K/UL
ABSOLUTE MONOCYTE (OHS): 0.91 K/UL (ref 0.3–1)
ABSOLUTE NEUTROPHIL COUNT (OHS): 6.98 K/UL (ref 1.8–7.7)
BASOPHILS # BLD AUTO: 0.03 K/UL
BASOPHILS NFR BLD AUTO: 0.3 %
ERYTHROCYTE [DISTWIDTH] IN BLOOD BY AUTOMATED COUNT: 13.2 % (ref 11.5–14.5)
HCT VFR BLD AUTO: 33.5 % (ref 37–48.5)
HGB BLD-MCNC: 10.9 GM/DL (ref 12–16)
IMM GRANULOCYTES # BLD AUTO: 0.06 K/UL (ref 0–0.04)
IMM GRANULOCYTES NFR BLD AUTO: 0.6 % (ref 0–0.5)
INDIRECT COOMBS: ABNORMAL
LYMPHOCYTES # BLD AUTO: 1.86 K/UL (ref 1–4.8)
MCH RBC QN AUTO: 32.2 PG (ref 27–31)
MCHC RBC AUTO-ENTMCNC: 32.5 G/DL (ref 32–36)
MCV RBC AUTO: 99 FL (ref 82–98)
NUCLEATED RBC (/100WBC) (OHS): 0 /100 WBC
PLATELET # BLD AUTO: 183 K/UL (ref 150–450)
PMV BLD AUTO: 10.9 FL (ref 9.2–12.9)
RBC # BLD AUTO: 3.39 M/UL (ref 4–5.4)
RELATIVE EOSINOPHIL (OHS): 0.4 %
RELATIVE LYMPHOCYTE (OHS): 18.8 % (ref 18–48)
RELATIVE MONOCYTE (OHS): 9.2 % (ref 4–15)
RELATIVE NEUTROPHIL (OHS): 70.7 % (ref 38–73)
RH BLD: ABNORMAL
RH IMMUNE GLOBULIN: NORMAL
ROSETTE - FMH (FETAL BLEED SCREEN): NORMAL
WBC # BLD AUTO: 9.88 K/UL (ref 3.9–12.7)

## 2025-07-18 PROCEDURE — 36415 COLL VENOUS BLD VENIPUNCTURE: CPT | Performed by: STUDENT IN AN ORGANIZED HEALTH CARE EDUCATION/TRAINING PROGRAM

## 2025-07-18 PROCEDURE — 85461 HEMOGLOBIN FETAL: CPT | Performed by: STUDENT IN AN ORGANIZED HEALTH CARE EDUCATION/TRAINING PROGRAM

## 2025-07-18 PROCEDURE — 3E0234Z INTRODUCTION OF SERUM, TOXOID AND VACCINE INTO MUSCLE, PERCUTANEOUS APPROACH: ICD-10-PCS | Performed by: OBSTETRICS & GYNECOLOGY

## 2025-07-18 PROCEDURE — 63600175 PHARM REV CODE 636 W HCPCS: Mod: JZ,TB

## 2025-07-18 PROCEDURE — 51798 US URINE CAPACITY MEASURE: CPT

## 2025-07-18 PROCEDURE — 99232 SBSQ HOSP IP/OBS MODERATE 35: CPT | Mod: ,,, | Performed by: STUDENT IN AN ORGANIZED HEALTH CARE EDUCATION/TRAINING PROGRAM

## 2025-07-18 PROCEDURE — 86850 RBC ANTIBODY SCREEN: CPT | Performed by: STUDENT IN AN ORGANIZED HEALTH CARE EDUCATION/TRAINING PROGRAM

## 2025-07-18 PROCEDURE — 11000001 HC ACUTE MED/SURG PRIVATE ROOM

## 2025-07-18 PROCEDURE — 63600519 RHOGAM PHARM REV CODE 636 ALT 250 W HCPCS: Performed by: STUDENT IN AN ORGANIZED HEALTH CARE EDUCATION/TRAINING PROGRAM

## 2025-07-18 PROCEDURE — 51701 INSERT BLADDER CATHETER: CPT

## 2025-07-18 PROCEDURE — 85025 COMPLETE CBC W/AUTO DIFF WBC: CPT

## 2025-07-18 PROCEDURE — 25000003 PHARM REV CODE 250

## 2025-07-18 RX ADMIN — PRENATAL VIT W/ FE FUMARATE-FA TAB 27-0.8 MG 1 TABLET: 27-0.8 TAB at 08:07

## 2025-07-18 RX ADMIN — FERROUS SULFATE TAB 325 MG (65 MG ELEMENTAL FE) 1 EACH: 325 (65 FE) TAB at 08:07

## 2025-07-18 RX ADMIN — IBUPROFEN 800 MG: 400 TABLET ORAL at 05:07

## 2025-07-18 RX ADMIN — ACETAMINOPHEN 650 MG: 325 TABLET ORAL at 11:07

## 2025-07-18 RX ADMIN — OXYCODONE HYDROCHLORIDE 10 MG: 10 TABLET ORAL at 08:07

## 2025-07-18 RX ADMIN — ACETAMINOPHEN 650 MG: 325 TABLET ORAL at 08:07

## 2025-07-18 RX ADMIN — IBUPROFEN 800 MG: 400 TABLET ORAL at 11:07

## 2025-07-18 RX ADMIN — ACETAMINOPHEN 650 MG: 325 TABLET ORAL at 03:07

## 2025-07-18 RX ADMIN — DOCUSATE SODIUM 200 MG: 100 CAPSULE, LIQUID FILLED ORAL at 08:07

## 2025-07-18 RX ADMIN — OXYCODONE HYDROCHLORIDE 10 MG: 10 TABLET ORAL at 05:07

## 2025-07-18 RX ADMIN — ACETAMINOPHEN 650 MG: 325 TABLET ORAL at 05:07

## 2025-07-18 RX ADMIN — HUMAN RHO(D) IMMUNE GLOBULIN 300 MCG: 300 INJECTION, SOLUTION INTRAMUSCULAR at 08:07

## 2025-07-18 RX ADMIN — KETOROLAC TROMETHAMINE 30 MG: 30 INJECTION, SOLUTION INTRAMUSCULAR at 05:07

## 2025-07-18 RX ADMIN — OXYCODONE HYDROCHLORIDE 10 MG: 10 TABLET ORAL at 02:07

## 2025-07-18 NOTE — CARE UPDATE
Resident to OR after spinal epidural placed to perform SSE    SSE performed and no HSV lesions noted    Oly Campos MD  Obstetrics and Gynecology - PGY3

## 2025-07-18 NOTE — LACTATION NOTE
Regeneratehony pump, tubing, collections containers and labels brought to bedside.  Discussed proper pump setting of initiation phase.  Instructed on proper usage of pump and to adjust suction according to maximum comfort level. Verified appropriate flange fit.  Educated on the frequency and duration of pumping in order to promote and maintain a full milk supply. Hands on pumping technique reviewed. Instructed on cleaning of breast pump parts. Written instructions also given. Pt verbalized understanding and appropriate recall for proper milk handling, collection, labeling, storage and transportation.

## 2025-07-18 NOTE — ANESTHESIA POSTPROCEDURE EVALUATION
Anesthesia Post Evaluation    Patient: Elvia Nassar    Procedure(s) Performed: Procedure(s) (LRB):   SECTION (N/A)    Final Anesthesia Type: spinal      Patient location during evaluation: labor & delivery  Patient participation: Yes- Able to Participate  Level of consciousness: awake and alert, awake and oriented  Post-procedure vital signs: reviewed and stable  Pain management: adequate  Airway patency: patent  KAVYA mitigation strategies: Multimodal analgesia and Use of major conduction anesthesia (spinal/epidural) or peripheral nerve block  PONV status at discharge: No PONV  Anesthetic complications: no      Cardiovascular status: blood pressure returned to baseline and hemodynamically stable  Respiratory status: unassisted, spontaneous ventilation and room air  Hydration status: euvolemic  Follow-up not needed.              Vitals Value Taken Time   /78 25 17:35   Temp 36.6 °C (97.9 °F) 25 17:35   Pulse 81 25 17:35   Resp 18 25 17:35   SpO2 98 % 25 17:35         Event Time   Out of Recovery 13:45:00         Pain/Balaji Score: Pain Rating Prior to Med Admin: 5 (2025  5:58 PM)  Pain Rating Post Med Admin: 5 (2025  2:55 PM)

## 2025-07-18 NOTE — LACTATION NOTE
07/18/25 1800   Maternal Assessment   Breast Shape Bilateral:;round   Breast Density Bilateral:;soft   Areola Bilateral:;elastic   Nipples Bilateral:;everted   Breast Pumping   Breast Pumping Interventions frequent pumping encouraged     Pt pumping for nicu baby. Pt has no questions. Pt has breastpump for home use.

## 2025-07-18 NOTE — PLAN OF CARE
Patient safety maintained, side rails up x2, bed low and locked position. Pt ambulating. Pt unable to void post crump. In and out cath done at 0500. Pt due to void at 1100. Pain well controlled with PRN and scheduled pain medication. Fundus midline, firm, with moderate lochia. Pt breast pumping for infant in NICU. Hydrocolloid dressing clean, dry, and intact. Significant other at bedside and assisting in patient's care. Repeat CBC drawn and leyla drawn.

## 2025-07-18 NOTE — LACTATION NOTE
"This note was copied from a baby's chart.  Lactation Note:   Met mother at bedside; SelinaRN at bedside present in person for Salvadorean interpretation for breast feeding and pumping for nicu baby education. Introduced self. Mom verbalized desire to breast feed/provide breast milk for her baby. She exclusively breast fed her 18y/o son previously. We discussed the importance of early and frequent pumping in first two weeks to establish a full breast milk supply. Encouraged pumping 8 or more times in 24 hours and skin to skin care with lick/learn/latch practice as often as able. Discussed pumping every 2-3 hours with only one 5-hour break without pumping for sleep. Recommended pumping schedule discussed/provided with pump log/encouraged mom to set alarms to pump;reviewed daily milk volume expectations(also provided on log). Pumping supplies at bedside; mom has a symphony pump in her room and a home medela breast pump as well. Handouts in Salvadorean provided/briefly reviewed on "increasing milk supply for nicu baby", "engorgement", "Storage/handling-CDC guidelines", "plugged duct/mastitis" and [employer] arelis information (QR code and access code provided in Salvadorean) for mom to have 24/7 lactation support in her language at no cost to her.  Encouragement and support offered to mom.       "

## 2025-07-18 NOTE — MEDICAL/APP STUDENT
"POSTPARTUM PROGRESS NOTE    Subjective:     PPD/POD#: 1   Procedure: Repeat LTCS   EGA: 38w5d   N/V: No   F/C: No   Abd Pain: Mild, well-controlled with oral pain medication   Lochia: Mild   Voiding: Unable to void post crump. In & out done at 0500. Pt due to void at 1100. Will reassess at that time.   Ambulating: Yes   Bowel fnc: No BM or gas. Encouraged to continue ambulating and chew gum   Contraception: Progesterone only pills     Objective:      Temp:  [97.5 °F (36.4 °C)-98.3 °F (36.8 °C)] 98.3 °F (36.8 °C)  Pulse:  [] 69  Resp:  [16-18] 17  SpO2:  [96 %-100 %] 98 %  BP: ()/(52-75) 102/69    Abdomen: Soft, appropriately tender   Uterus: Firm, no fundal tenderness   Incision: Bandage in place without shadowing     Lab Review    No results for input(s): "NA", "K", "CL", "CO2", "BUN", "CREATININE", "GLU", "PROT", "BILITOT", "ALKPHOS", "ALT", "AST", "MG", "PHOS" in the last 168 hours.    Recent Labs   Lab 07/17/25  0855   WBC 6.86   HGB 13.2   HCT 38.5   MCV 95            I/O    Intake/Output Summary (Last 24 hours) at 7/18/2025 0655  Last data filed at 7/18/2025 0500  Gross per 24 hour   Intake 1100 ml   Output 1870 ml   Net -770 ml        Assessment and Plan:   Postpartum care:  - Patient doing well.  - Continue routine management and advances.    Rh negative  - Baby: O POS  - Will receive Rhogam today    HSV  - on Valtrex  - sepculum exam performed in OR prior to rLTCS. No HSV lesions noted.    Fetal Large Bowel Dilation  -  35w3d USS showed bowel dilation measuring 28mm at its greatest.   - NICU present at delivery. Baby in NICU now.  - Apgars 8/9    Marla Davis, MS4  Obstetrics & Gynecology  "

## 2025-07-18 NOTE — PROGRESS NOTES
"     POSTPARTUM PROGRESS NOTE     Subjective:      PPD/POD#: 1   Procedure: Repeat LTCS   EGA: 38w5d   N/V: No   F/C: No   Abd Pain: Mild, well-controlled with oral pain medication   Lochia: Mild   Voiding: Unable to void post crump. In & out done at 0500. Pt due to void at 1100. Will reassess at that time.   Ambulating: Yes   Bowel fnc: No BM or gas. Encouraged to continue ambulating and chew gum   Contraception: Progesterone only pills      Objective:      Temp:  [97.5 °F (36.4 °C)-98.3 °F (36.8 °C)] 98.3 °F (36.8 °C)  Pulse:  [] 69  Resp:  [16-18] 17  SpO2:  [96 %-100 %] 98 %  BP: ()/(52-75) 102/69     Abdomen: Soft, appropriately tender   Uterus: Firm, no fundal tenderness   Incision: Bandage in place without shadowing      Lab Review     No results for input(s): "NA", "K", "CL", "CO2", "BUN", "CREATININE", "GLU", "PROT", "BILITOT", "ALKPHOS", "ALT", "AST", "MG", "PHOS" in the last 168 hours.         Recent Labs   Lab 25  0855   WBC 6.86   HGB 13.2   HCT 38.5   MCV 95               I/O     Intake/Output Summary (Last 24 hours) at 2025 0655  Last data filed at 2025 0500      Gross per 24 hour   Intake 1100 ml   Output 1870 ml   Net -770 ml         Assessment and Plan:   Postpartum care:  - Patient doing well.  - Continue routine management and advances.     Rh negative  - Baby: O POS  - Will receive Rhogam today     HSV  - on Valtrex  - sepculum exam performed in OR prior to rLTCS. No HSV lesions noted.     Fetal Large Bowel Dilation  -  35w3d USS showed bowel dilation measuring 28mm at its greatest.   - NICU present at delivery. Baby in NICU now.  - Apgars 8/9     Marla Davis, MS4  Obstetrics & Gynecology          FELLOW ATTESTATION    Patient is a 35 y.o.  POD1 s/p RCS at 38w in the setting of fetal large bowel dilation. Maternal vitals WNL, exam this morning is stable. Labs this morning show H/H 10.9/33.5 Patient's pregnancy otherwise uncomplicated. Overall patient " is recovering appropriately. Will repeat TOV today. Encouraged ambulation today. We reviewed that our recommendation today is for ongoing inpatient monitoring. Will plan for discharge on POD3-4. Remainder of plan as below:    Temp:  [97.5 °F (36.4 °C)-98.3 °F (36.8 °C)] 97.9 °F (36.6 °C)  Pulse:  [] 82  Resp:  [16-18] 18  SpO2:  [96 %-100 %] 100 %  BP: ()/(52-80) 118/80    Meenu Parks PGY6  Maternal Fetal Medicine Fellow

## 2025-07-18 NOTE — LACTATION NOTE
YUPIQhony pump, tubing, collections containers and labels brought to bedside.  Discussed proper pump setting of initiation phase.  Instructed on proper usage of pump and to adjust suction according to maximum comfort level.  Verified appropriate flange fit.  Educated on the frequency and duration of pumping in order to promote and maintain a full milk supply.  Hands on pumping technique reviewed.  Encouraged hand expression after pumping.  Instructed on cleaning of breast pump parts.  Written instructions also given.  Pt verbalized understanding and appropriate recall for proper milk handling, collection, labeling, storage and transportation.

## 2025-07-19 PROCEDURE — 11000001 HC ACUTE MED/SURG PRIVATE ROOM

## 2025-07-19 PROCEDURE — 25000003 PHARM REV CODE 250

## 2025-07-19 PROCEDURE — 99231 SBSQ HOSP IP/OBS SF/LOW 25: CPT | Mod: ,,, | Performed by: OBSTETRICS & GYNECOLOGY

## 2025-07-19 RX ADMIN — IBUPROFEN 800 MG: 400 TABLET ORAL at 10:07

## 2025-07-19 RX ADMIN — IBUPROFEN 800 MG: 400 TABLET ORAL at 02:07

## 2025-07-19 RX ADMIN — ACETAMINOPHEN 650 MG: 325 TABLET ORAL at 01:07

## 2025-07-19 RX ADMIN — IBUPROFEN 800 MG: 400 TABLET ORAL at 05:07

## 2025-07-19 RX ADMIN — SIMETHICONE 80 MG: 80 TABLET, CHEWABLE ORAL at 12:07

## 2025-07-19 RX ADMIN — OXYCODONE HYDROCHLORIDE 10 MG: 10 TABLET ORAL at 11:07

## 2025-07-19 RX ADMIN — DOCUSATE SODIUM 200 MG: 100 CAPSULE, LIQUID FILLED ORAL at 08:07

## 2025-07-19 RX ADMIN — ACETAMINOPHEN 650 MG: 325 TABLET ORAL at 09:07

## 2025-07-19 RX ADMIN — PRENATAL VIT W/ FE FUMARATE-FA TAB 27-0.8 MG 1 TABLET: 27-0.8 TAB at 08:07

## 2025-07-19 RX ADMIN — OXYCODONE HYDROCHLORIDE 10 MG: 10 TABLET ORAL at 10:07

## 2025-07-19 RX ADMIN — ACETAMINOPHEN 650 MG: 325 TABLET ORAL at 05:07

## 2025-07-19 RX ADMIN — FERROUS SULFATE TAB 325 MG (65 MG ELEMENTAL FE) 1 EACH: 325 (65 FE) TAB at 08:07

## 2025-07-19 RX ADMIN — SIMETHICONE 80 MG: 80 TABLET, CHEWABLE ORAL at 08:07

## 2025-07-19 RX ADMIN — OXYCODONE HYDROCHLORIDE 10 MG: 10 TABLET ORAL at 04:07

## 2025-07-19 RX ADMIN — OXYCODONE HYDROCHLORIDE 10 MG: 10 TABLET ORAL at 12:07

## 2025-07-19 NOTE — CARE UPDATE
Plan of Care     Resident to bedside to assess patient for [place] PPD screening score of 11.     PPD Screening:   - Prior psych diagnosis: none   - H/o suicide attempt: none  - Current medications: none  - Current psychiatrist/therapist: none  - Current PPD/EPDS Score: 11  - Score interpretation: 8-11; depression possible. Re screen in 2-4 weeks with primary OBGYN.       Source:        Plan:  - Counseled patient on  behavioral health resources, medication options, declining at this time   - Plan to see OBGYN for 2 week PP mood check, patient amenable to plan    Nivia Burns MD  Obstetrics & Gynecology, PGY-1

## 2025-07-19 NOTE — LACTATION NOTE
07/19/25 1715   Maternal Assessment   Breast Shape Bilateral:;round   Breast Density Bilateral:;soft   Areola Bilateral:;elastic   Nipples Bilateral:;everted   Equipment Type   Breast Pump Type double electric, hospital grade   Breast Pump Flange Type hard   Breast Pump Flange Size 21 mm     Utilizing Alba(): Pt expressed the desire to have breastmilk for baby in the NICU, but explained that she hasn't been consistently pumping. LC acknowledged understanding and reinforced the importance of pumping eight or more in twenty-four to build/maintain supply. Pt commented that she is experiencing pain when pumping. LC assessed Pt's flange size. LC questioned the strength of suction being used. Pt reports using the highest suction available. LC encouraged Pt to adjust suction down to the most comfortable setting. LC reinforced use and maintenance of Medela Symphony breast pump. LC demonstrated hand expression and encouraged Pt to hand express after pumping. Pt confirmed that she received her breast pump through insurance. All questions answered.

## 2025-07-19 NOTE — PLAN OF CARE
VSS  Patient denies HA, dizziness, vision changes, and RUQ pain. Pt safety maintained, side rails up, bed low and locked position. Pt ambulating independently and voiding without difficulty. Pain  controlled with  pain medication. Fundus is midline and firm with minimal lochia. Hydrocolloid dressing in place; dressing is clean and dry with no signs of infection. Significant other at bedside and attentive to patient's needs; will go down to NICU later to see the baby.No additional needs at this time.

## 2025-07-19 NOTE — PROGRESS NOTES
"POSTPARTUM PROGRESS NOTE    Subjective:     PPD/POD#: 2   Procedure: Repeat LTCS   EGA: 38w5d   N/V: No   F/C: No   Abd Pain: Mild, well-controlled with oral pain medication   Lochia: Mild   Voiding: Yes   Ambulating: Yes   Bowel fnc: No BM, now passing flatus   Contraception: Progesterone only pills     Objective:      Temp:  [97.8 °F (36.6 °C)-98.3 °F (36.8 °C)] 98.3 °F (36.8 °C)  Pulse:  [79-82] 79  Resp:  [17-18] 17  SpO2:  [98 %-100 %] 100 %  BP: (100-119)/(64-80) 119/64    Abdomen: Soft, appropriately tender   Uterus: Firm, no fundal tenderness   Incision: Bandage in place without shadowing     Lab Review    No results for input(s): "NA", "K", "CL", "CO2", "BUN", "CREATININE", "GLU", "PROT", "BILITOT", "ALKPHOS", "ALT", "AST", "MG", "PHOS" in the last 168 hours.    Recent Labs   Lab 25  0855 25  0607   WBC 6.86 9.88   HGB 13.2 10.9*   HCT 38.5 33.5*   MCV 95 99*    183     I/O    Intake/Output Summary (Last 24 hours) at 2025 0456  Last data filed at 2025 1700  Gross per 24 hour   Intake --   Output 1850 ml   Net -1850 ml        Assessment and Plan:     Postpartum care:  - Patient doing well.  - Continue routine management and advances.    Rh negative  - Baby: O POS  - Received Rhogam yesterday    HSV  - Previously on Valtrex, discontinued yesterday  - sepculum exam performed in OR prior to rLTCS. No HSV lesions noted.    Fetal Large Bowel Dilation  -  35w3d USS showed bowel dilation measuring 28mm at its greatest.   - NICU present at delivery. Baby in NICU now.  - Apgars 8/9      Zaynab Vasquez MD  [facility]  OB/GYN, PGY-1    FELLOW ATTESTATION     Patient is a 35 y.o.  POD2 s/p RCS at 38w in the setting of fetal large bowel dilation. Maternal vitals WNL, exam this morning is stable. Labs yesterday morning show H/H 10.9/33.5. Patient's pregnancy otherwise uncomplicated. Overall patient is recovering appropriately. Encouraged ambulation today. We reviewed that " our recommendation today is for ongoing inpatient monitoring. Will plan for discharge on POD3-4. Remainder of plan as above.     Temp:  [97.8 °F (36.6 °C)-98.3 °F (36.8 °C)] 98.3 °F (36.8 °C)  Pulse:  [75-81] 75  Resp:  [17-18] 18  SpO2:  [96 %-100 %] 96 %  BP: (100-119)/(57-78) 113/57     Meenu Parks PGY6  Maternal Fetal Medicine Fellow

## 2025-07-19 NOTE — PROGRESS NOTES
25 0600   [place]  Depression Scale:  In the Past 7 Days   I have been able to laugh and see the funny side of things. 0   I have looked forward with enjoyment to things. 0   I have blamed myself unnecessarily when things went wrong. 1   I have been anxious or worried for no good reason. 3   I have felt scared or panicky for no good reason. 3   Things have been getting on top of me. 0   I have been so unhappy that I have had difficulty sleeping. 0   I have felt sad or miserable. 2   I have been so unhappy that I have been crying. 2   The thought of harming myself has occurred to me. 0   [place]  Depression Scale Total 11     MD notified.

## 2025-07-20 ENCOUNTER — ANESTHESIA EVENT (OUTPATIENT)
Dept: OBSTETRICS AND GYNECOLOGY | Facility: OTHER | Age: 35
End: 2025-07-20
Payer: MEDICAID

## 2025-07-20 ENCOUNTER — ANESTHESIA (OUTPATIENT)
Dept: OBSTETRICS AND GYNECOLOGY | Facility: OTHER | Age: 35
End: 2025-07-20
Payer: MEDICAID

## 2025-07-20 PROCEDURE — 99231 SBSQ HOSP IP/OBS SF/LOW 25: CPT | Mod: ,,, | Performed by: OBSTETRICS & GYNECOLOGY

## 2025-07-20 PROCEDURE — 11000001 HC ACUTE MED/SURG PRIVATE ROOM

## 2025-07-20 PROCEDURE — 62273 INJECT EPIDURAL PATCH: CPT | Mod: ,,, | Performed by: ANESTHESIOLOGY

## 2025-07-20 PROCEDURE — 25000003 PHARM REV CODE 250

## 2025-07-20 RX ADMIN — DOCUSATE SODIUM 200 MG: 100 CAPSULE, LIQUID FILLED ORAL at 07:07

## 2025-07-20 RX ADMIN — ONDANSETRON 8 MG: 8 TABLET, ORALLY DISINTEGRATING ORAL at 01:07

## 2025-07-20 RX ADMIN — IBUPROFEN 800 MG: 400 TABLET ORAL at 01:07

## 2025-07-20 RX ADMIN — ACETAMINOPHEN 650 MG: 325 TABLET ORAL at 04:07

## 2025-07-20 RX ADMIN — IBUPROFEN 800 MG: 400 TABLET ORAL at 04:07

## 2025-07-20 RX ADMIN — ACETAMINOPHEN 650 MG: 325 TABLET ORAL at 03:07

## 2025-07-20 RX ADMIN — IBUPROFEN 800 MG: 400 TABLET ORAL at 07:07

## 2025-07-20 RX ADMIN — ACETAMINOPHEN 650 MG: 325 TABLET ORAL at 07:07

## 2025-07-20 RX ADMIN — OXYCODONE HYDROCHLORIDE 10 MG: 10 TABLET ORAL at 09:07

## 2025-07-20 RX ADMIN — PRENATAL VIT W/ FE FUMARATE-FA TAB 27-0.8 MG 1 TABLET: 27-0.8 TAB at 07:07

## 2025-07-20 RX ADMIN — FERROUS SULFATE TAB 325 MG (65 MG ELEMENTAL FE) 1 EACH: 325 (65 FE) TAB at 07:07

## 2025-07-20 NOTE — LACTATION NOTE
07/20/25 1715   Maternal Assessment   Breast Shape Bilateral:;round   Breast Density Bilateral:;engorged   Equipment Type   Breast Pump Type double electric, hospital grade   Breast Pump Flange Type hard   Breast Pump Flange Size 21 mm   Breast Pumping   Breast Pumping Interventions frequent pumping encouraged     Utilizing Polly [id number]-Pt shared that she is experiencing pain in her breast. LC confirmed pain is occurring in breast and not nipple. With permission, LC assessed breast. LC assisted with making ice packs and demonstrated technique to FOB. LC educated Pt on prevention and management of engorgement.LC encouraged Pt to switch to pumping on maintain phase and encourage Pt to pump 15-20 minutes,but not more than 30 minutes. LC reviewed cleaning and sanitization of breast pump parts. All questions answered.

## 2025-07-20 NOTE — PLAN OF CARE
VSS Patient denies HA, dizziness, vision changes, and RUQ pain. Pt safety maintained, side rails up, bed low and locked position. Pt ambulating independently and voiding without difficulty. Pain  controlled with  pain medication. Fundus is midline and firm with minimal lochia. Hydrocolloid dressing in place; dressing is clean and dry with no signs of infection. Significant other at bedside and attentive to patient's needs; parents visit NICU. No additional needs at this time.

## 2025-07-20 NOTE — ANESTHESIA PREPROCEDURE EVALUATION
"[facility]  Anesthesia Pre-Operative Evaluation         Patient Name: Elvia Nassar  YOB: 1990  MRN: [id number]    2025      Elvia Nassar is a 35 y.o. female  @ 38w5d who presents s/p  section w/ uncomplicated spinal anesthesia now with symptoms c/w spinal headache.     No new bleeding/clotting disorders, cardiopulmonary abnormalities or spine pathology.     OB History    Para Term  AB Living   2 2 2   2   SAB IAB Ectopic Multiple Live Births      0 2      # Outcome Date GA Lbr Keshav/2nd Weight Sex Type Anes PTL Lv   2 Term 25 38w5d  3.07 kg (6 lb 12.3 oz) F CS-LTranv Spinal N ANGELA   1 Term 10/11/07 40w0d  2.25 kg (4 lb 15.4 oz) M CS-Unspec   ANGELA      Birth Comments: Pelvimetry performed in [facility], was told she cannot labor       Review of patient's allergies indicates:  No Known Allergies    Wt Readings from Last 1 Encounters:   25 1115 80.6 kg (177 lb 11.1 oz)       BP Readings from Last 3 Encounters:   25 118/60   07/10/25 112/73   25 101/69       Problem List[1]    Past Surgical History:   Procedure Laterality Date     SECTION      Reports she could not labor dur to pelvimetry in [facility]     SECTION N/A 2025    Procedure:  SECTION;  Surgeon: Jessica Hoffmann MD;  [facility]&;  Service: OB/GYN;  Laterality: N/A;       Social History[2]      Chemistry    No results found for: "NA", "K", "CL", "CO2", "BUN", "CREATININE", "GLU" No results found for: "CALCIUM", "ALKPHOS", "AST", "ALT", "BILITOT", "ESTGFRAFRICA", "EGFRNONAA"         Lab Results   Component Value Date    WBC 9.88 2025    HGB 10.9 (L) 2025    HCT 33.5 (L) 2025    MCV 99 (H) 2025     2025       No results for input(s): "PT", "INR", "PROTIME", "APTT" in the last 72 hours.          Pre-op Assessment    I have reviewed the Patient Summary Reports.     I have reviewed the " Nursing Notes. I have reviewed the NPO Status.   I have reviewed the Medications.     Review of Systems  Anesthesia Hx:  No problems with previous Anesthesia               Denies Personal Hx of Anesthesia complications.                    Cardiovascular:  Cardiovascular Normal                                              Pulmonary:  Pulmonary Normal                       Renal/:  Renal/ Normal                 Hepatic/GI:  Hepatic/GI Normal                    Neurological:  Neurology Normal                                          Physical Exam  General: Well nourished, Cooperative and Alert    Airway:  Mallampati: III / II  Mouth Opening: Normal  Tongue: Normal  Neck ROM: Normal ROM    Dental:  Intact        Anesthesia Plan  Type of Anesthesia, risks & benefits discussed:    Anesthesia Type: Epidural  Intra-op Monitoring Plan: Standard ASA Monitors  Post Op Pain Control Plan: multimodal analgesia and IV/PO Opioids PRN  Induction:  IV  Airway Plan: Direct, Post-Induction  Informed Consent: Informed consent signed with the Patient and all parties understand the risks and agree with anesthesia plan.  All questions answered.   ASA Score: 2  Day of Surgery Review of History & Physical: H&P Update referred to the surgeon/provider.    Ready For Surgery From Anesthesia Perspective.     .           [1]   Patient Active Problem List  Diagnosis    Abnormal prenatal ultrasound    Encounter for screening for congenital cardiac abnormalities in fetus    Iron deficiency    Decreased fetal movements in third trimester    Delivery by elective  section    Rh negative status during pregnancy in third trimester   [2]   Social History  Socioeconomic History    Marital status:    Tobacco Use    Smoking status: Never    Smokeless tobacco: Never   Vaping Use    Vaping status: Never Used   Substance and Sexual Activity    Alcohol use: Not Currently    Drug use: Never    Sexual activity: Not Currently     Partners: Male      Social Drivers of Health     Financial Resource Strain: Low Risk  (7/17/2025)    Overall Financial Resource Strain (CARDIA)     Difficulty of Paying Living Expenses: Not hard at all   Food Insecurity: No Food Insecurity (7/17/2025)    Hunger Vital Sign     Worried About Running Out of Food in the Last Year: Never true     Ran Out of Food in the Last Year: Never true   Transportation Needs: No Transportation Needs (7/17/2025)    PRAPARE - Transportation     Lack of Transportation (Medical): No     Lack of Transportation (Non-Medical): No   Physical Activity: Insufficiently Active (5/26/2025)    Exercise Vital Sign     Days of Exercise per Week: 3 days     Minutes of Exercise per Session: 20 min   Stress: No Stress Concern Present (7/17/2025)    [employer] - Occupational Stress Questionnaire     Feeling of Stress : Not at all   Housing Stability: Low Risk  (7/17/2025)    Housing Stability Vital Sign     Unable to Pay for Housing in the Last Year: No     Number of Times Moved in the Last Year: 0     Homeless in the Last Year: No

## 2025-07-20 NOTE — CARE UPDATE
Resident to bedside to evaluate headache.    Patient complaining of diffuse headache for 2 days with new onset nausea and vomiting x2 this morning. States the pain is worse with standing, rating 8/10 on standing and 4/10 sitting. States headache subsides after oxycodone but persistently returns.    Likely spinal headache. Anesthesia team notified and will assess the patient.    Zaynab Vasquez MD  [facility]  OB/GYN, PGY-1

## 2025-07-20 NOTE — PROGRESS NOTES
"POSTPARTUM PROGRESS NOTE    Subjective:     PPD/POD#: 3   Procedure: Repeat LTCS   EGA: 38w5d   N/V: Mild nausea, no emesis   F/C: No   Abd Pain: Mild, well-controlled with oral pain medication   Lochia: Mild   Voiding: Yes   Ambulating: Yes   Bowel fnc: Yes   Contraception: Progesterone only pills     Objective:      Temp:  [98.3 °F (36.8 °C)-98.7 °F (37.1 °C)] 98.4 °F (36.9 °C)  Pulse:  [74-89] 89  Resp:  [17-18] 17  SpO2:  [96 %-98 %] 98 %  BP: (100-118)/(51-80) 112/80    Abdomen: Soft, appropriately tender   Uterus: Firm, no fundal tenderness   Incision: Bandage in place without shadowing     Lab Review    No results for input(s): "NA", "K", "CL", "CO2", "BUN", "CREATININE", "GLU", "PROT", "BILITOT", "ALKPHOS", "ALT", "AST", "MG", "PHOS" in the last 168 hours.    Recent Labs   Lab 25  0855 25  0607   WBC 6.86 9.88   HGB 13.2 10.9*   HCT 38.5 33.5*   MCV 95 99*    183     I/O  No intake or output data in the 24 hours ending 25 0639       Assessment and Plan:   35  POD#3 s/p rLTCS, recovering appropriately.     Postpartum care:  - Patient doing well.  - Continue routine management and advances  - York score 11: patient counseled, schedule 2 week mood check on dc    Rh negative  - Baby: O POS  - Received Rhogam     HSV  - Previously on Valtrex, discontinued   - speculum exam performed in OR prior to rLTCS. No HSV lesions noted.    Fetal Large Bowel Dilation  - 35w3d US showed bowel dilation measuring 28mm at its greatest.   - NICU present at delivery. Baby in NICU now.  - Apgars 8/9      Nivia Burns MD  Obstetrics & Gynecology, PGY-1  "

## 2025-07-20 NOTE — ANESTHESIA PROCEDURE NOTES
Epidural Blood Patch    Patient location during procedure: OB   Reason for epidural blood patch: post dural puncture headache   Start time: 7/20/2025 2:22 PM  Timeout: 7/20/2025 2:20 PM  End time: 7/20/2025 2:32 PM     Staffing  Authorizing Provider: Rosalba Moscoso MD  Performing Provider: Dania Miranda MD    Staffing  Performed by: Dania Miranda MD  Authorized by: Rosalba Moscoso MD    Preanesthetic Checklist  Completed: patient identified, IV checked, risks and benefits discussed, surgical consent, monitors and equipment checked, pre-op evaluation, timeout performed, anesthesia consent given, hand hygiene performed and patient being monitored  Preparation  Patient position: sitting  Prep: ChloraPrep  Patient monitoring: Blood Pressure and Pulse Ox Block not for primary anesthetic.  Epidural Blood Patch  Skin Anesthetic: lidocaine 1%  Skin Wheal: 2 mL  Approach: midline  Interspace: L4-5  Collection Location: venous    Needle and Epidural Catheter  Needle type: Tuohy   Needle gauge: 17  Needle length: 3.5 inches  Needle insertion depth: 6.5 cm  Additional Documentation: incremental injection  Needle localization: anatomical landmarks  Assessment  Ease of injection: easy  Patient's tolerance of the procedure: comfortable throughoutAmount of blood injected: 15mL.  Additional Notes  Upon initiation of procedure just prior to blood injection, patient denied presence of HA at that time. As first 10cc blood was injected into the epidural space, the patient began complaining of increased pressure in her head, which resolved when injection ceased. Due to absence of headache an initiatino of procedure, it is difficult to evaluate if headache is currently resolved. Patient otherwise tolerated the procedure well, will re-evaluate her symptoms one hour post-procedure.  Anesthesiologist Present  Yes  Post Dural Headache Resolved   Yes (see comments below)

## 2025-07-21 ENCOUNTER — TELEPHONE (OUTPATIENT)
Dept: OBSTETRICS AND GYNECOLOGY | Facility: CLINIC | Age: 35
End: 2025-07-21
Payer: MEDICAID

## 2025-07-21 VITALS
OXYGEN SATURATION: 97 % | HEIGHT: 60 IN | SYSTOLIC BLOOD PRESSURE: 119 MMHG | TEMPERATURE: 99 F | RESPIRATION RATE: 18 BRPM | HEART RATE: 73 BPM | BODY MASS INDEX: 34.89 KG/M2 | WEIGHT: 177.69 LBS | DIASTOLIC BLOOD PRESSURE: 65 MMHG

## 2025-07-21 PROBLEM — G97.1 SPINAL HEADACHE: Status: ACTIVE | Noted: 2025-07-21

## 2025-07-21 PROCEDURE — 99238 HOSP IP/OBS DSCHRG MGMT 30/<: CPT | Mod: ,,, | Performed by: OBSTETRICS & GYNECOLOGY

## 2025-07-21 PROCEDURE — 25000003 PHARM REV CODE 250

## 2025-07-21 RX ORDER — OXYCODONE HYDROCHLORIDE 5 MG/1
5 TABLET ORAL EVERY 4 HOURS PRN
Qty: 20 TABLET | Refills: 0 | Status: SHIPPED | OUTPATIENT
Start: 2025-07-21

## 2025-07-21 RX ORDER — IBUPROFEN 600 MG/1
600 TABLET, FILM COATED ORAL EVERY 6 HOURS
Qty: 60 TABLET | Refills: 0 | Status: SHIPPED | OUTPATIENT
Start: 2025-07-21

## 2025-07-21 RX ORDER — DOCUSATE SODIUM 100 MG/1
200 CAPSULE, LIQUID FILLED ORAL 2 TIMES DAILY
Qty: 120 CAPSULE | Refills: 0 | Status: SHIPPED | OUTPATIENT
Start: 2025-07-21

## 2025-07-21 RX ORDER — ACETAMINOPHEN 325 MG/1
650 TABLET ORAL EVERY 6 HOURS
Qty: 60 TABLET | Refills: 0 | Status: SHIPPED | OUTPATIENT
Start: 2025-07-21

## 2025-07-21 RX ORDER — FERROUS SULFATE 325(65) MG
325 TABLET, DELAYED RELEASE (ENTERIC COATED) ORAL DAILY
Qty: 60 TABLET | Refills: 0 | Status: SHIPPED | OUTPATIENT
Start: 2025-07-21

## 2025-07-21 RX ORDER — DROSPIRENONE 4 MG/1
4 TABLET, FILM COATED ORAL DAILY
Qty: 28 TABLET | Refills: 11 | Status: SHIPPED | OUTPATIENT
Start: 2025-07-21

## 2025-07-21 RX ADMIN — PRENATAL VIT W/ FE FUMARATE-FA TAB 27-0.8 MG 1 TABLET: 27-0.8 TAB at 07:07

## 2025-07-21 RX ADMIN — OXYCODONE HYDROCHLORIDE 5 MG: 5 TABLET ORAL at 06:07

## 2025-07-21 RX ADMIN — ACETAMINOPHEN 650 MG: 325 TABLET ORAL at 11:07

## 2025-07-21 RX ADMIN — ACETAMINOPHEN 650 MG: 325 TABLET ORAL at 12:07

## 2025-07-21 RX ADMIN — ACETAMINOPHEN 650 MG: 325 TABLET ORAL at 06:07

## 2025-07-21 RX ADMIN — IBUPROFEN 800 MG: 400 TABLET ORAL at 07:07

## 2025-07-21 RX ADMIN — DOCUSATE SODIUM 200 MG: 100 CAPSULE, LIQUID FILLED ORAL at 07:07

## 2025-07-21 RX ADMIN — FERROUS SULFATE TAB 325 MG (65 MG ELEMENTAL FE) 1 EACH: 325 (65 FE) TAB at 07:07

## 2025-07-21 RX ADMIN — DOCUSATE SODIUM 200 MG: 100 CAPSULE, LIQUID FILLED ORAL at 12:07

## 2025-07-21 RX ADMIN — IBUPROFEN 800 MG: 400 TABLET ORAL at 12:07

## 2025-07-21 NOTE — DISCHARGE SUMMARY
Delivery Discharge Summary  Obstetrics      Primary OB Clinician: Jessica Hoffmann MD      Admission date: 2025  Discharge date: 2025    Disposition: To home, self care    Discharge Diagnosis List:    Problem List[1]    Procedure: , due to previous C/S     Hospital Course:  Elvia Nassar is a 35 y.o. now , POD#4 who was admitted on 2025 at 38w5d for rLTCS in the setting of known fetal dilated bowel. Patient was subsequently admitted to labor and delivery unit with signed consents.     Please see delivery note for further details. Her postpartum course was complciated by Rh- status, s/p Rhogam. On discharge day, patient's pain is controlled with oral pain medications. Pt is tolerating ambulation without SOB or CP, and regular diet without N/V. Reports lochia is mild. Denies any HA, vision changes, F/C, LE swelling. Denies any breast pain/soreness.    Pt in stable condition and ready for discharge. She has been instructed to start and/or continue medications and follow up with her obstetrics provider as listed below.    Vital Signs:   Temp:  [97.9 °F (36.6 °C)-98.8 °F (37.1 °C)] 98.8 °F (37.1 °C)  Pulse:  [74-82] 82  Resp:  [16-18] 18  SpO2:  [98 %-100 %] 100 %  BP: (110-143)/(60-76) 115/76    Physical Exam:  Abdomen: Soft, appropriately tender   Uterus: Firm, no fundal tenderness   Incision: Bandage in place with minimal shadowing       Pertinent studies:  CBC  Recent Labs   Lab 25  0855 25  0607   WBC 6.86 9.88   HGB 13.2 10.9*   HCT 38.5 33.5*   MCV 95 99*    183        Immunization History   Administered Date(s) Administered    Rho (D) Immune Globulin - IM 2024, 2025, 2025    Tdap 2025        Delivery:    Episiotomy:     Lacerations:     Repair suture:     Repair # of packets:     Blood loss (ml):       Birth information:  YOB: 2023   Time of birth: 10:31 AM   Sex: female   Delivery type: , Low  "Transverse   Gestational Age: 38w5d     Measurements    Weight: 3070 g  Weight (lbs): 6 lb 12.3 oz  Length: 50.8 cm  Length (in): 20"  Head circumference: 33.5 cm  Chest circumference: 33 cm         Delivery Clinician: Delivery Providers    Delivering clinician: Jessica Hoffmann MD   Provider Role    Meenu Parks MD Fellow    Oly Campos MD Resident    Kayce Harrison RN Circulator    Alyssa Ruby ST Scrub Person             Additional  information:  Forceps:    Vacuum:    Breech:    Observed anomalies      Living?:     Apgars    Living status: Living  Apgar Component Scores:  1 min.:  5 min.:  10 min.:  15 min.:  20 min.:    Skin color:  0  1       Heart rate:  2  2       Reflex irritability:  2  2       Muscle tone:  2  2       Respiratory effort:  2  2       Total:  8  9       Apgars assigned by: ROLO IRENE RN (NICU)         Placenta: Delivered:       appearance        2025     6:00 AM   [place]  Depression Scale   I have been able to laugh and see the funny side of things. 0   I have looked forward with enjoyment to things. 0   I have blamed myself unnecessarily when things went wrong. 1   I have been anxious or worried for no good reason. 3   I have felt scared or panicky for no good reason. 3   Things have been getting on top of me. 0   I have been so unhappy that I have had difficulty sleeping. 0   I have felt sad or miserable. 2   I have been so unhappy that I have been crying. 2   The thought of harming myself has occurred to me. 0        Patient Instructions:   Current Discharge Medication List        START taking these medications    Details   acetaminophen (TYLENOL) 325 MG tablet Take 2 tablets (650 mg total) by mouth every 6 (six) hours. Alternate between ibuprofen and tylenol every 3 hours. For example: @0800: ibuprofen 600mg @1100: tylenol 650mg @1400: ibuprofen 600mg @1700: tylenol 650 mg @2000: ibuprofen 600mg  Qty: 60 tablet, Refills: 0      docusate sodium (COLACE) 100 " MG capsule Take 2 capsules (200 mg total) by mouth 2 (two) times daily.  Qty: 120 capsule, Refills: 0      drospirenone, contraceptive, (SLYND) 4 mg (28) Tab Take 1 tablet (4 mg total) by mouth once daily.  Qty: 30 tablet, Refills: 11      ibuprofen (ADVIL,MOTRIN) 600 MG tablet Take 1 tablet (600 mg total) by mouth every 6 (six) hours. Alternate between ibuprofen and tylenol every 3 hours. For example: @0800: ibuprofen 600mg @1100: tylenol 650mg @1400: ibuprofen 600mg @1700: tylenol 650 mg @2000: ibuprofen 600mg  Qty: 60 tablet, Refills: 0      oxyCODONE (ROXICODONE) 5 MG immediate release tablet Take 1 tablet (5 mg total) by mouth every 4 (four) hours as needed for Pain.  Qty: 20 tablet, Refills: 0    Comments: Quantity prescribed more than 7 day supply? No  Associated Diagnoses: Delivery by elective  section           CONTINUE these medications which have CHANGED    Details   ferrous sulfate 325 (65 FE) MG EC tablet Take 1 tablet (325 mg total) by mouth once daily.  Qty: 60 tablet, Refills: 0           CONTINUE these medications which have NOT CHANGED    Details   PNV,calcium 72/iron/folic acid (PRENATAL VITAMIN) Tab Take 1 tablet by mouth once daily.  Qty: 90 tablet, Refills: 3      valACYclovir (VALTREX) 500 MG tablet Take 1 tablet (500 mg total) by mouth 2 (two) times daily.  Qty: 60 tablet, Refills: 11             Discharge Procedure Orders   Diet Adult Regular     Lifting restrictions   Order Comments: No lifting more than 15 pounds for 6 weeks     No driving until:   Order Comments: - Not taking narcotic pain medications  - You feel that you can safely slam on the brakes     Pelvic Rest   Order Comments: Nothing in vagina until evaluation at postpartum visit (no sex, tampons, or douching)     No dressing needed     Notify your health care provider if you experience any of the following:  temperature >100.4     Notify your health care provider if you experience any of the following:  persistent nausea  and vomiting or diarrhea     Notify your health care provider if you experience any of the following:  severe uncontrolled pain     Notify your health care provider if you experience any of the following:  redness, tenderness, or signs of infection (pain, swelling, redness, odor or green/yellow discharge around incision site)     Notify your health care provider if you experience any of the following:  difficulty breathing or increased cough     Notify your health care provider if you experience any of the following:  severe persistent headache     Notify your health care provider if you experience any of the following:  worsening rash     Notify your health care provider if you experience any of the following:  persistent dizziness, light-headedness, or visual disturbances     Notify your health care provider if you experience any of the following:  increased confusion or weakness     Notify your health care provider if you experience any of the following:   Order Comments: - Heavy vaginal bleeding soaking through more than 2 pads/hour for > 2 hours  - Severe abdominal pain  - Drainage from your incision  - Headache not relieved with Tylenol  - Vision changes  - Chest pain or shortness or breath     Activity as tolerated     Shower on day dressing removed (No bath)   Order Comments: Do not submerge your incision in a bathtub until evaluation at postpartum visit        Follow-up Information       Kelli Timmons MD Follow up in 6 week(s).    Specialty: Obstetrics and Gynecology  Why: 6 week post partum visit  Contact information:  [address] [address] [phone]                              Rosamaria Michaud MD ([facility])   Obstetrics and Gynecology, PGY1         [1]   Patient Active Problem List  Diagnosis    Abnormal prenatal ultrasound    Encounter for screening for congenital cardiac abnormalities in fetus    Iron deficiency    Decreased fetal movements in third trimester    Delivery by elective   section    Rh negative status during pregnancy in third trimester    Spinal headache

## 2025-07-21 NOTE — TELEPHONE ENCOUNTER
Patient has an appointment  scheduled on 7/24 for a bandage removal. Do you want to have an additional follow up as requested by RN      ----- Message from KELVIN Soriano sent at 7/21/2025  1:18 PM CDT -----  Hi, can you please call ms trujillo to schedule a 2 week post partum mood check appt. Thank you   As per MD note:  Postpartum care:  - Patient doing well.  - Continue routine management and advances  - Oak[place] score 11: patient counseled, schedule 2 week mood check on dc

## 2025-07-21 NOTE — DISCHARGE INSTRUCTIONS
Community Resources for Breastfeeding Mothers:   Hospital Breastfeeding Centers/ Lactation Consultants:   [facility]........................................................................................[phone]  [facility]....................................................................................[phone]   [facility]..........................[phone]   [facility].................[phone]   [facility].......................................................[phone]   [facility].................................[phone]   [facility].......................................[phone]   [facility]..................................................[phone]   [facility].....................................................................[phone]      [facility] (Poison Control)...........[phone]  PoisonHelp.org   Free medical advice 24/7 through the Poison Help Line and the online tool      Online Resources:   International Breastfeeding [place] ...............................................................................ibconline.ca   Dr Daryl Denis online resource provides videos, articles, and information sheets.     Coeffective...............................................................................................................coeffective.com   Download the free mobile arelis to help get off to a great start with breastfeeding.   Droplet.....................................................................................................................[employer].BTC.sx   Global Health  Media...........................................................................................[employer].org   Videos that teach and empower mothers and caregivers   Infant [facility].............................................................................[phone]      Playtabase   Provides up to date information for medication use by moms during pregnancy and while breastfeeding.   Roxy Gomez....................................................................................................................kellymom.com   Provides online information on breastfeeding and parenting      La Leche League........................................................................................ lllalmsla.org   /   llli.org   Mother-to-mother support groups with education, information support, and encouragement    Work and Pump........................................................................................... [employer].com   Information about breastfeeding for working moms     [place] Resources:   [place] Breastfeeding [facility]............................... [phone]    [facility]   Find local breastfeeding support   [place] Breastfeeding Support............................................................ LaBreastfeedingSport.org   Zip code search of breastfeeding resources in your area   Partners for Healthy Babies............................................................[phone]   6750240vsso.org   Connects [place] moms and their families to health and pregnancy resources.  24/7   WIC (Women, Infant, Children)......................................................... [phone]   ldh.la.gov/WIC   Download the Whim arelis, get code from WIC office    [facility] Resources:     Baby Cafe............................................................................................................. babycafeusa.org   Free, drop in, informal  breastfeeding support groups offering professional lactation care and intervention.    [facility] [place] [facility]....................................... birthLevellandProject Green.Zeptor   Infant feeding drop in clinics, Lactation services, support groups, education programs   Cafe au Lait...............................................[phone]   barcoo.com/groups/[facility]   Free breastfeeding support group for families of color   Mothers Milk Bank [facility]....................................................[phone]                                                             [facility]/services/[facility]-milk-bank[facility]   i[employer]act[employer].................... [email or web address]..................[phone]    JOSE Nesting..................................................................................[phone] nolanesting.com   In person and virtual support for families through pregnancy, birth, and early parenthood.       [facility] [place]- Dr. Anupama Boone.......................[phone] [address] [place] [address]                                  [email or web address]   [email or web address]   [facility], [facility] (Barbara Cuevas RN, IBCLC) ............................[phone]    [email or web address]    Healthy Start [place].....................................[phone] ([place])  [phone] ([place])   [facility].gov/health-department/healthy-start   Serves women of childbearing age and addresses issues for pregnant women and their children from birth  to age two.          La Leche LeagueGrand [facility]............................. [employer].Zeptor/ barcoo.Zeptor/ alma rosa   In person and virtual mother to mother support groups with education, information support and   encouragement to women who  want to breastfeed      [place] Resources:   Breastfeeding Resources- [facility].....msd.ms.gov (under womens services)   Find resources and info about planning for breastfeeding, its benefits, and help with breastfeeding  s uccessfully.    [facility]....................................... "Flyer[employer] Inc[employer]   [phone]   [address] [facility] [place], [place]. Call or text 24/7   [facility].......................................................[email or web address]ter.CallFire   [facility] Lactation Consultants sere [facility], including [facility],   [facility], and surrounding areas.    [place] Breastfeeding Coalition...............................................................................msbfc.org   Promotes and supports breastfeeding with families, health providers, and communities.   [facility] breastfeeding Coalition.....................................................................smbfc.org   Find breastfeeding resources and support groups in your area.    WIC Nutrition Program- [facility] of Health.................................... [place].gov

## 2025-07-21 NOTE — PLAN OF CARE
Patient in no apparent distress. VSS. Ambulating without difficulty. No needs at this time. Discharge papers given and reviewed. All questions answered. Instructions given on how to walk to pharmacy given    Problem: Adult Inpatient Plan of Care  Goal: Plan of Care Review  Outcome: Met  Goal: Patient-Specific Goal (Individualized)  Outcome: Met  Goal: Absence of Hospital-Acquired Illness or Injury  Outcome: Met  Goal: Optimal Comfort and Wellbeing  Outcome: Met  Goal: Readiness for Transition of Care  Outcome: Met     Problem:  Fall Injury Risk  Goal: Absence of Fall, Infant Drop and Related Injury  Outcome: Met     Problem: Postpartum ( Delivery)  Goal: Successful Parent Role Transition  Outcome: Met  Goal: Hemostasis  Outcome: Met  Goal: Effective Bowel Elimination  Outcome: Met  Goal: Fluid and Electrolyte Balance  Outcome: Met  Goal: Absence of Infection Signs and Symptoms  Outcome: Met  Goal: Optimal Pain Control and Function  Outcome: Met  Goal: Effective Urinary Elimination  Outcome: Met  Goal: Effective Oxygenation and Ventilation  Outcome: Met     Problem: Infection  Goal: Absence of Infection Signs and Symptoms  Outcome: Met     Problem: Wound  Goal: Optimal Coping  Outcome: Met  Goal: Optimal Functional Ability  Outcome: Met  Goal: Absence of Infection Signs and Symptoms  Outcome: Met  Goal: Improved Oral Intake  Outcome: Met  Goal: Optimal Pain Control and Function  Outcome: Met  Goal: Skin Health and Integrity  Outcome: Met  Goal: Optimal Wound Healing  Outcome: Met     Problem: Parent-Child Attachment Altered  Goal: Attachment Behaviors Demonstrated  Outcome: Met

## 2025-07-21 NOTE — LACTATION NOTE
07/21/25 1220   Maternal Assessment   Breast Shape Bilateral:;round   Breast Density Bilateral:;engorged   Equipment Type   Breast Pump Type double electric, hospital grade   Breast Pumping   Breast Pumping Interventions frequent pumping encouraged   Community Referrals   Community Referrals outpatient lactation program;pediatric care provider;support group     Lactation discharge education completed using  # . Pt pumping 15-20 minutes every 2-3 hours. Pt given icepacks to put on breasts after shower for engorgement. Discussed using hands free pumping bra or significant other holding pump in place for pt to utilize gentle massage to help empty breasts effectively. Pt has 24 hour pumping log given by NICU. Pt to continue pumping 8 or more times in 24 hours. Breastfeeding guide, resource list, and lactation warmline phone number reviewed. Pt to notify doctor for maternal or infant concerns, as reviewed with LC. Pt verbalizes understanding and questions answered.

## 2025-07-21 NOTE — PROGRESS NOTES
"POSTPARTUM PROGRESS NOTE    Subjective:     PPD/POD#: 3   Procedure: Repeat LTCS   EGA: 38w5d   N/V: Mild nausea, no emesis   F/C: No   Abd Pain: Mild, well-controlled with oral pain medication   Lochia: Mild   Voiding: Yes   Ambulating: Yes   Bowel fnc: Yes   Contraception: Progesterone only pills       Pt reports feeling well this morning, wishes to discharge later this PM after baby's surgery. No other concerns at this time.       Objective:      Temp:  [97.9 °F (36.6 °C)-98.8 °F (37.1 °C)] 98.8 °F (37.1 °C)  Pulse:  [74-82] 82  Resp:  [16-18] 18  SpO2:  [98 %-100 %] 100 %  BP: (110-143)/(60-76) 115/76    Abdomen: Soft, appropriately tender   Uterus: Firm, no fundal tenderness   Incision: Bandage in place with minimal shadowing     Lab Review    No results for input(s): "NA", "K", "CL", "CO2", "BUN", "CREATININE", "GLU", "PROT", "BILITOT", "ALKPHOS", "ALT", "AST", "MG", "PHOS" in the last 168 hours.    Recent Labs   Lab 25  0855 25  0607   WBC 6.86 9.88   HGB 13.2 10.9*   HCT 38.5 33.5*   MCV 95 99*    183     I/O  No intake or output data in the 24 hours ending 25 0625       Assessment and Plan:   35  POD#3 s/p rLTCS, recovering appropriately.     Postpartum care:  - Patient doing well.  - Continue routine management and advances  - Luverne score 11: patient counseled, schedule 2 week mood check on dc    Rh negative  - Baby: O POS  - Received Rhogam     HSV  - Previously on Valtrex, discontinued   - speculum exam performed in OR prior to rLTCS. No HSV lesions noted.    Fetal Large Bowel Dilation  - 35w3d US showed bowel dilation measuring 28mm at its greatest.   - NICU present at delivery. Baby in NICU now.  - Apgars 8/9  - Surgery today () per pediatrics       Rosamaria Michaud MD (Mary)   Obstetrics and Gynecology, PGY2     FELLOW ATTESTATION     Patient is a 35 y.o.  POD4 s/p RCS at 38w in the setting of fetal large bowel dilation. Maternal vitals WNL, exam this " morning is stable. POD1 labs show H/H 10.9/33.5. Patient's pregnancy otherwise uncomplicated. Overall patient is recovering appropriately and meeting all post operative milestones. Will plan for discharge today. Remainder of plan as above.     Temp:  [98.1 °F (36.7 °C)-98.8 °F (37.1 °C)] 98.1 °F (36.7 °C)  Pulse:  [74-82] 81  Resp:  [16-18] 18  SpO2:  [97 %-100 %] 97 %  BP: (110-143)/(63-76) 116/63     Meenu Parks PGY6  Maternal Fetal Medicine Fellow

## 2025-07-21 NOTE — LACTATION NOTE
This note was copied from a baby's chart.  Called to bedside re: mom unsure how to use Medela Symphony pump.    Education provided via  Lindsey #[id number] on [employer] arelis.   Mother oriented to breast pump buttons, modes, and parts.  Explained difference between stimulation mode and expression mode and how to toggle between.  Reinforced that pumping should not hurt and to use the lowest comfortable suction level.  Demonstrated how to put pump parts together and reviewed cleaning guidelines.  Encouraged mother to utilized lactation consultants if concerned flanges to do not fit.  Encouraged continued follow up with LCs for further questions/concerns.  Mother verbalized understanding of all, and stated she wanted to pump at this time s/t engorgement/fullness/hardness and pain.  Praise given for continuing to provide breastmilk for her baby.

## 2025-07-22 ENCOUNTER — PATIENT MESSAGE (OUTPATIENT)
Dept: OBSTETRICS AND GYNECOLOGY | Facility: OTHER | Age: 35
End: 2025-07-22
Payer: MEDICAID

## 2025-07-22 NOTE — TELEPHONE ENCOUNTER
No, we can see her for follow up on 7/24 since it is sooner, in 2 days, and schedule a follow up as needed from that visit. She likely didn't know she had a sooner visit.

## 2025-07-24 ENCOUNTER — LACTATION ENCOUNTER (OUTPATIENT)
Dept: INTENSIVE CARE | Facility: OTHER | Age: 35
End: 2025-07-24

## 2025-07-24 ENCOUNTER — POSTPARTUM VISIT (OUTPATIENT)
Dept: OBSTETRICS AND GYNECOLOGY | Facility: CLINIC | Age: 35
End: 2025-07-24
Payer: MEDICAID

## 2025-07-24 VITALS
HEART RATE: 72 BPM | WEIGHT: 166.13 LBS | DIASTOLIC BLOOD PRESSURE: 81 MMHG | BODY MASS INDEX: 32.62 KG/M2 | HEIGHT: 60 IN | SYSTOLIC BLOOD PRESSURE: 118 MMHG

## 2025-07-24 DIAGNOSIS — Z98.891 S/P REPEAT LOW TRANSVERSE C-SECTION: Primary | ICD-10-CM

## 2025-07-24 PROCEDURE — 99213 OFFICE O/P EST LOW 20 MIN: CPT | Mod: PBBFAC,PO | Performed by: OBSTETRICS & GYNECOLOGY

## 2025-07-24 PROCEDURE — 99999 PR PBB SHADOW E&M-EST. PATIENT-LVL III: CPT | Mod: PBBFAC,,, | Performed by: OBSTETRICS & GYNECOLOGY

## 2025-07-24 RX ORDER — DROSPIRENONE 4 MG/1
4 TABLET, FILM COATED ORAL DAILY
Qty: 84 TABLET | Refills: 3 | Status: SHIPPED | OUTPATIENT
Start: 2025-07-24

## 2025-07-24 NOTE — LACTATION NOTE
This note was copied from a baby's chart.  Devonte Arguelles Daniel#[id number] used for entire conversation with mother at baby's bedside(Day 7):  Mom reports having held her baby skin to skin already. She is pumping~6x/24hrs, for ~20min,yielding~60-100ml per pump session with her home medela breast pump and the symphony pump at baby's bedside-praised mom, while also reiterating the importance of pumping 8x/24hrs for the next week to ensure full/long-term milk supply for her baby. Mom verbalized desire to feed her baby an exclusive breast milk diet. Pumping schedule provided and encouraged mom to track times/volumes pumped, reviewed daily volume goals/expectations for this next week. Mom v/u. She denies any breast,nipple or areola pain. Lactation number provided for mom to call, state her name/ for a lactation return call for any breast feeding needs/questions. Mom v/u. Encouragement and support provided.

## 2025-07-24 NOTE — PROGRESS NOTES
CC: follow up delivery    HPI:   Elvia Nassar is a 35 y.o. here for f/u . She reports normal bowel movements and urination. Pain is controlled with OTC medications. She is pumping for baby in NICU. Desires pills for contraception. Denies depression, si very sad about baby in NICU but coping ok and declines medications .     Vitals:    25 1052   BP: 118/81   Pulse: 72       PHYSICAL EXAM:   APPEARANCE: Well nourished, well developed, in no acute distress.  ABDOMEN: Soft. No tenderness or masses. No hernias. well healed Pfannenstiel incision  PELVIC: deferred     RLTCD     PLAN:   1. OK to resume normal activities. Contraception discussed with patient. Patient desires pills, was given slynd in hospital .  2. Return to clinic in 4 weeks for check up

## 2025-08-03 ENCOUNTER — LACTATION ENCOUNTER (OUTPATIENT)
Dept: INTENSIVE CARE | Facility: OTHER | Age: 35
End: 2025-08-03

## 2025-08-03 NOTE — LACTATION NOTE
This note was copied from a baby's chart.  Lactation note:     Met mom at bedside. Jeff #[id number] was the  used for translation purposes. Mom expressed concerns about decrease in supply. Mom stated she is sleeping from 11pm-7am. We talked about increasing the amount of pumps she will have in one day. We made a new schedule of 8/11/2/5/8/11/4/6. This allows for one 5hr break, and for 8 pumps in a day. Asked mom to see if this will help increase supply over the next two days. If no supply change, power pumping was suggested. Mom and infant latched for 5 mins without assistance.

## 2025-08-04 ENCOUNTER — LACTATION ENCOUNTER (OUTPATIENT)
Dept: INTENSIVE CARE | Facility: OTHER | Age: 35
End: 2025-08-04

## 2025-08-04 NOTE — LACTATION NOTE
This note was copied from a baby's chart.  Mother/Baby being followed by lactation.  ANDREW assisted mother with breastfeeding session. Mother positioned infant in cradle hold to right breast. Infant not able to latch deeply. LC assisted mother with re-positioning baby in cross cradle hold to same breast. Infant latched deeply and suckled with good rhythm. After about 5 min mother reported to feel let down in left breast. Infant changed to cross cradle to left breast. Infant breast fed beautifully x 15 min total. Transferred 26 ml at breast. Mother stated that she is now pumping 7 x/day since yesterday after speaking with Momo Brown RN, [facility]. Pumps 60 ml in am then about 40 ml/pump for remainder of pumping sessions (2 weeks). Discussed increasing milk production and adding power pumping to pump routine. Mother also reports possible pumping dysphoria with pumping. Mother feelings acknowledged. Handout given on D-ELLA. Mother stated that she does not have these feeling with latching only pumping. Mother also stated that she pumps more milk with her personal breast pump.  Power Pumping: Use the following pumping pattern 1-3 x/day              1. Pump for 20 minutes;rest 10 minutes     2. Pump another 10 minutes; rest 10 minutes   3. Pump again 10 minutes; finish  This provides 40 minutes of pumping in a 60 minute period. At other times during the day, use routine pumping (20-30 minutes). Some women see results in 48 hours and other women may take up to a week to see results. Recommended pumping 8-10 x/day total.    Increasing Breast Milk Supply for Baby in the NICU:   1. Pumping 8 or more times a day or every 2-3 hours with only one 4-5 hour break for sleep notifies your breasts that they need to produce milk.   2. Use a bi-lateral pump kit. This stimulates your milk supply better than pumping each breast.  3. Pump 20-30 minutes.   4. Use breast massage and hand expression to remove remaining milk. Rotate your hands around  the breasts to empty all areas. Massage can make a tremendous difference in how much milk you obtain while pumping.  5. Make sure that your flanges fit properly: Your nipple should freely move in center of flange without rubbing on sides; Only the nipple is pulled into the flange, none of the areola. No pain with pumping, only a tugging sensation. There should be no compression ring or blanched skin around the areola.  6. Stimulate your let-down reflex: Let down is when your milk is flowing easily.  Hold your baby skin to skin, massage your breasts, look at a picture of your baby and think of your baby, relax your shoulders, listen to relaxing music, drink plenty of fluids, avoid caffeine, smoking and alcohol, use warm compresses.    Evelin Liu, BSN, RNC, IBCLC

## 2025-08-13 RX ORDER — PRENATAL WITH FERROUS FUM AND FOLIC ACID 3080; 920; 120; 400; 22; 1.84; 3; 20; 10; 1; 12; 200; 27; 25; 2 [IU]/1; [IU]/1; MG/1; [IU]/1; MG/1; MG/1; MG/1; MG/1; MG/1; MG/1; UG/1; MG/1; MG/1; MG/1; MG/1
1 TABLET ORAL DAILY
Qty: 90 TABLET | Refills: 3 | Status: SHIPPED | OUTPATIENT
Start: 2025-08-13 | End: 2026-08-13

## 2025-08-22 ENCOUNTER — POSTPARTUM VISIT (OUTPATIENT)
Dept: OBSTETRICS AND GYNECOLOGY | Facility: CLINIC | Age: 35
End: 2025-08-22
Payer: MEDICAID

## 2025-08-22 VITALS
BODY MASS INDEX: 30.21 KG/M2 | HEIGHT: 60 IN | SYSTOLIC BLOOD PRESSURE: 109 MMHG | WEIGHT: 153.88 LBS | HEART RATE: 84 BPM | DIASTOLIC BLOOD PRESSURE: 75 MMHG

## 2025-08-22 DIAGNOSIS — N95.2 VAGINAL ATROPHY: ICD-10-CM

## 2025-08-22 DIAGNOSIS — Z98.891 S/P REPEAT LOW TRANSVERSE C-SECTION: Primary | ICD-10-CM

## 2025-08-22 PROCEDURE — 99999 PR PBB SHADOW E&M-EST. PATIENT-LVL III: CPT | Mod: PBBFAC,,, | Performed by: OBSTETRICS & GYNECOLOGY

## 2025-08-22 PROCEDURE — 99213 OFFICE O/P EST LOW 20 MIN: CPT | Mod: PBBFAC,PO | Performed by: OBSTETRICS & GYNECOLOGY

## 2025-08-22 RX ORDER — NORETHINDRONE 0.35 MG/1
1 TABLET ORAL DAILY
Qty: 84 TABLET | Refills: 3 | Status: SHIPPED | OUTPATIENT
Start: 2025-08-22 | End: 2026-08-22

## 2025-08-22 RX ORDER — ESTRADIOL 0.1 MG/G
CREAM VAGINAL
Qty: 85 G | Refills: 1 | Status: SHIPPED | OUTPATIENT
Start: 2025-08-22 | End: 2026-08-31

## 2025-08-22 RX ORDER — PRENATAL WITH FERROUS FUM AND FOLIC ACID 3080; 920; 120; 400; 22; 1.84; 3; 20; 10; 1; 12; 200; 27; 25; 2 [IU]/1; [IU]/1; MG/1; [IU]/1; MG/1; MG/1; MG/1; MG/1; MG/1; MG/1; UG/1; MG/1; MG/1; MG/1; MG/1
1 TABLET ORAL DAILY
Qty: 90 TABLET | Refills: 3 | Status: SHIPPED | OUTPATIENT
Start: 2025-08-22 | End: 2026-08-22

## 2025-08-25 RX ORDER — PRENATAL WITH FERROUS FUM AND FOLIC ACID 3080; 920; 120; 400; 22; 1.84; 3; 20; 10; 1; 12; 200; 27; 25; 2 [IU]/1; [IU]/1; MG/1; [IU]/1; MG/1; MG/1; MG/1; MG/1; MG/1; MG/1; UG/1; MG/1; MG/1; MG/1; MG/1
1 TABLET ORAL DAILY
Qty: 90 TABLET | Refills: 3 | OUTPATIENT
Start: 2025-08-25 | End: 2026-08-25

## 2025-08-31 RX ORDER — NORETHINDRONE 0.35 MG/1
1 TABLET ORAL DAILY
Qty: 84 TABLET | Refills: 3 | OUTPATIENT
Start: 2025-08-31 | End: 2026-08-31

## 2025-08-31 RX ORDER — PRENATAL WITH FERROUS FUM AND FOLIC ACID 3080; 920; 120; 400; 22; 1.84; 3; 20; 10; 1; 12; 200; 27; 25; 2 [IU]/1; [IU]/1; MG/1; [IU]/1; MG/1; MG/1; MG/1; MG/1; MG/1; MG/1; UG/1; MG/1; MG/1; MG/1; MG/1
1 TABLET ORAL DAILY
Qty: 90 TABLET | Refills: 3 | OUTPATIENT
Start: 2025-08-31 | End: 2026-08-31

## 2025-08-31 RX ORDER — ESTRADIOL 0.1 MG/G
CREAM VAGINAL
Qty: 85 G | Refills: 1 | OUTPATIENT
Start: 2025-08-31 | End: 2026-09-09